# Patient Record
Sex: MALE | Race: WHITE | Employment: UNEMPLOYED | URBAN - METROPOLITAN AREA
[De-identification: names, ages, dates, MRNs, and addresses within clinical notes are randomized per-mention and may not be internally consistent; named-entity substitution may affect disease eponyms.]

---

## 2019-09-06 ENCOUNTER — HOSPITAL ENCOUNTER (EMERGENCY)
Age: 44
Discharge: HOME | End: 2019-09-07
Payer: MEDICAID

## 2019-09-06 DIAGNOSIS — S05.01XA ABRASION OF RIGHT CORNEA, INITIAL ENCOUNTER: Primary | ICD-10-CM

## 2019-09-06 DIAGNOSIS — T15.01XA FOREIGN BODY OF RIGHT CORNEA, INITIAL ENCOUNTER: ICD-10-CM

## 2019-09-06 PROCEDURE — 2580000003 HC RX 258: Performed by: NURSE PRACTITIONER

## 2019-09-06 PROCEDURE — 6360000002 HC RX W HCPCS: Performed by: NURSE PRACTITIONER

## 2019-09-06 PROCEDURE — 90471 IMMUNIZATION ADMIN: CPT | Performed by: NURSE PRACTITIONER

## 2019-09-06 PROCEDURE — 96365 THER/PROPH/DIAG IV INF INIT: CPT

## 2019-09-06 PROCEDURE — 90715 TDAP VACCINE 7 YRS/> IM: CPT | Performed by: NURSE PRACTITIONER

## 2019-09-06 PROCEDURE — 4500000023 HC ED LEVEL 3 PROCEDURE

## 2019-09-06 PROCEDURE — 99283 EMERGENCY DEPT VISIT LOW MDM: CPT

## 2019-09-06 RX ORDER — SODIUM CHLORIDE, SODIUM LACTATE, POTASSIUM CHLORIDE, AND CALCIUM CHLORIDE .6; .31; .03; .02 G/100ML; G/100ML; G/100ML; G/100ML
1000 INJECTION, SOLUTION INTRAVENOUS ONCE
Status: COMPLETED | OUTPATIENT
Start: 2019-09-06 | End: 2019-09-07

## 2019-09-06 RX ORDER — ERYTHROMYCIN 5 MG/G
OINTMENT OPHTHALMIC
Qty: 1 G | Refills: 0 | Status: SHIPPED | OUTPATIENT
Start: 2019-09-06

## 2019-09-06 RX ADMIN — TETANUS TOXOID, REDUCED DIPHTHERIA TOXOID AND ACELLULAR PERTUSSIS VACCINE, ADSORBED 0.5 ML: 5; 2.5; 8; 8; 2.5 SUSPENSION INTRAMUSCULAR at 23:42

## 2019-09-06 RX ADMIN — SODIUM CHLORIDE, POTASSIUM CHLORIDE, SODIUM LACTATE AND CALCIUM CHLORIDE 1000 ML: 600; 310; 30; 20 INJECTION, SOLUTION INTRAVENOUS at 23:44

## 2019-09-06 ASSESSMENT — VISUAL ACUITY
OU: 20/20
OS: 20/25
OD: 20/25

## 2019-09-06 ASSESSMENT — PAIN SCALES - GENERAL: PAINLEVEL_OUTOF10: 4

## 2019-09-07 VITALS
HEIGHT: 70 IN | SYSTOLIC BLOOD PRESSURE: 144 MMHG | TEMPERATURE: 98.3 F | DIASTOLIC BLOOD PRESSURE: 81 MMHG | OXYGEN SATURATION: 98 % | HEART RATE: 78 BPM | RESPIRATION RATE: 16 BRPM | BODY MASS INDEX: 23.62 KG/M2 | WEIGHT: 165 LBS

## 2019-09-07 PROCEDURE — 6370000000 HC RX 637 (ALT 250 FOR IP): Performed by: NURSE PRACTITIONER

## 2019-09-07 RX ORDER — OXYCODONE HYDROCHLORIDE AND ACETAMINOPHEN 5; 325 MG/1; MG/1
1 TABLET ORAL ONCE
Status: COMPLETED | OUTPATIENT
Start: 2019-09-07 | End: 2019-09-07

## 2019-09-07 RX ORDER — OXYCODONE HYDROCHLORIDE AND ACETAMINOPHEN 5; 325 MG/1; MG/1
1 TABLET ORAL EVERY 6 HOURS PRN
Qty: 6 TABLET | Refills: 0 | Status: SHIPPED | OUTPATIENT
Start: 2019-09-07 | End: 2019-09-10

## 2019-09-07 RX ORDER — ERYTHROMYCIN 5 MG/G
OINTMENT OPHTHALMIC ONCE
Status: COMPLETED | OUTPATIENT
Start: 2019-09-07 | End: 2019-09-07

## 2019-09-07 RX ADMIN — OXYCODONE HYDROCHLORIDE AND ACETAMINOPHEN 1 TABLET: 5; 325 TABLET ORAL at 01:12

## 2019-09-07 RX ADMIN — ERYTHROMYCIN: 5 OINTMENT OPHTHALMIC at 01:12

## 2019-09-07 ASSESSMENT — PAIN SCALES - GENERAL
PAINLEVEL_OUTOF10: 2
PAINLEVEL_OUTOF10: 3

## 2019-09-07 NOTE — ED PROVIDER NOTES
[address] [facility] Emergency Department    CHIEF COMPLAINT  Foreign Body (right eye pieced of metal since yesterday)      HISTORY OF PRESENT ILLNESS  Natalya Anderson is a 37 y.o. male who presents to the ED complaining of right eye pain. Patient reports working on a car this afternoon and \"rust\" got into his eyes. Patient reports he feels like something is still in his right eye. Patient unsure of last tetanus vaccination status. Patient rates his pain a 6/10. Patient denies blurred vision. Patient does not wear contacts. No other complaints, modifying factors or associated symptoms. Nursing notes reviewed. Past Medical History:   Diagnosis Date    Cervical disc disease     C6-7; work injury 2006;     Fibrosing mediastinitis 2005    Histoplasmosis     Injury of nerve of wrist or hand 2009    saw injury left hand     Past Surgical History:   Procedure Laterality Date    PLEURAL BIOPSY Right 2005    SHOULDER SURGERY Left     for dislocation     Family History   Adopted: Yes     Social History     Socioeconomic History    Marital status: Single     Spouse name: Not on file    Number of children: 2    Years of education: Not on file    Highest education level: Not on file   Occupational History    Occupation: unemployed    Social Needs    Financial resource strain: Not on file    Food insecurity:     Worry: Not on file     Inability: Not on file   [employer] needs:     Medical: Not on file     Non-medical: Not on file   Tobacco Use    Smoking status: Current Every Day Smoker    Smokeless tobacco: Never Used    Tobacco comment: encouraged to consider quitting.    Substance and Sexual Activity    Alcohol use: No    Drug use: No    Sexual activity: Yes     Partners: Female     Comment: lives with  olamide   Lifestyle    Physical activity:     Days per week: Not on file     Minutes per session: Not on file    Stress: Not on file   Relationships    Social connections:     Talks on phone: Not on file     Gets together: Not on file     Attends Synagogue service: Not on file     Active member of club or organization: Not on file     Attends meetings of clubs or organizations: Not on file     Relationship status: Not on file    Intimate partner violence:     Fear of current or ex partner: Not on file     Emotionally abused: Not on file     Physically abused: Not on file     Forced sexual activity: Not on file   Other Topics Concern    Not on file   Social History Narrative    Not on file     No current facility-administered medications for this encounter. Current Outpatient Medications   Medication Sig Dispense Refill    oxyCODONE-acetaminophen (PERCOCET) 5-325 MG per tablet Take 1 tablet by mouth every 6 hours as needed for Pain for up to 3 days. Intended supply: 3 days. Take lowest dose possible to manage pain 6 tablet 0    erythromycin (ROMYCIN) 5 MG/GM ophthalmic ointment Apply 0.5 inch ribbon to right eye 4 times a day while awake 1 g 0    albuterol (PROVENTIL HFA;VENTOLIN HFA) 108 (90 BASE) MCG/ACT inhaler Inhale 2 puffs into the lungs every 6 hours as needed.  sertraline (ZOLOFT) 50 MG tablet Take 1 tablet by mouth daily. 30 tablet 3    gabapentin (NEURONTIN) 600 MG tablet Take 1 tablet by mouth 4 times daily. 120 tablet 0     Allergies   Allergen Reactions    Hydrocodone        REVIEW OF SYSTEMS  6 systems reviewed, pertinent positives per HPI otherwise noted to be negative    PHYSICAL EXAM  BP (!) 152/98 Comment: pt does not take b/p meds daily  Pulse 78   Temp 98.7 °F (37.1 °C) (Oral)   Resp 16   Ht 5' 10\" (1.778 m)   Wt 165 lb (74.8 kg)   SpO2 99%   BMI 23.68 kg/m²   GENERAL APPEARANCE: Awake and alert. Cooperative. No acute distress. HEAD: Normocephalic. Atraumatic. EYES: PERRL. EOM's grossly intact. Right sclera is erythematous with clear drainage. There is a small foreign body just below the pupil.  Corneal abrasion seen with fluorescein uptake to right cornea at 5 o clock below the iris and directly next to the FB over the pupil vertically. No hyphema. No Jenifer sign. Visual acuity bilaterally is 20/20, left is 20/25, right is 20/25. ENT: Mucous membranes are moist.   NECK: Supple. Normal ROM. CHEST: Equal symmetric chest rise. LUNGS: Breathing is unlabored. Speaking comfortably in full sentences. Abdomen: Nondistended  EXTREMITIES: MAEE. No acute deformities. SKIN: Warm and dry. NEUROLOGICAL: Alert and oriented. Strength is 5/5 in all extremities and sensation is intact. ED COURSE/MDM  Patient seen and evaluated. Old records reviewed. Diagnostic testing reviewed and results discussed. I have independently evaluated this patient based upon my scope of practice. Supervising physician was in the department as needed for consultation. Amando Nova presented to the ED today with above noted complaints. Physical exam reveals foreign body on the cornea. Foreign body seen on Q-tip. Right eye flush with a Husam's lens patient tolerated well. Patient had good relief of pain with tetracaine drops. Rust ring remains where foreign body was. Patient received tetracaine drops and Percocet for pain, with good relief. While in ED patient received   Medications   Tetanus-Diphth-Acell Pertussis (BOOSTRIX) injection 0.5 mL (0.5 mLs Intramuscular Given 9/6/19 2342)   lactated ringers bolus (0 mLs Intravenous Stopped 9/7/19 0006)   erythromycin [facility]) ophthalmic ointment ( Right Eye Given 9/7/19 0112)   oxyCODONE-acetaminophen (PERCOCET) 5-325 MG per tablet 1 tablet (1 tablet Oral Given 9/7/19 0112)       At this point I do not feel the patient requires further work up and it is reasonable to discharge the patient. A discussion was had with the patient and/or their surrogate regarding diagnosis, diagnostic testing results, treatment/ plan of care, and follow up.  There was shared decision-making between (ROMYCIN) 5 MG/GM OPHTHALMIC OINTMENT    Apply 0.5 inch ribbon to right eye 4 times a day while awake    OXYCODONE-ACETAMINOPHEN (PERCOCET) 5-325 MG PER TABLET    Take 1 tablet by mouth every 6 hours as needed for Pain for up to 3 days. Intended supply: 3 days. Take lowest dose possible to manage pain       FOLLOW UP  [address]  [phone]    Schedule an appointment as soon as possible for a visit   follow up    [facility]  ED  [address]  Go to   As needed, If symptoms worsen      DISPOSITION  Patient was discharged to home in good condition. Comment: Please note this report has been produced using speech recognition software and may contain errors related to that system including errors in grammar, punctuation, and spelling, as well as words and phrases that may be inappropriate. If there are any questions or concerns please feel free to contact the dictating provider for clarification.         JOHNSON Hanley - CNP  09/07/19 0043

## 2023-11-14 ENCOUNTER — APPOINTMENT (OUTPATIENT)
Dept: CARDIAC CATH/INVASIVE PROCEDURES | Age: 48
DRG: 173 | End: 2023-11-14
Payer: COMMERCIAL

## 2023-11-14 ENCOUNTER — APPOINTMENT (OUTPATIENT)
Dept: GENERAL RADIOLOGY | Age: 48
DRG: 173 | End: 2023-11-14
Payer: COMMERCIAL

## 2023-11-14 ENCOUNTER — APPOINTMENT (OUTPATIENT)
Dept: CT IMAGING | Age: 48
DRG: 173 | End: 2023-11-14
Payer: COMMERCIAL

## 2023-11-14 ENCOUNTER — HOSPITAL ENCOUNTER (INPATIENT)
Age: 48
LOS: 2 days | Discharge: HOME | DRG: 173 | End: 2023-11-16
Attending: EMERGENCY MEDICINE | Admitting: SURGERY
Payer: COMMERCIAL

## 2023-11-14 DIAGNOSIS — J96.01 ACUTE RESPIRATORY FAILURE WITH HYPOXIA (HCC): ICD-10-CM

## 2023-11-14 DIAGNOSIS — F19.90 SUBSTANCE USE DISORDER: ICD-10-CM

## 2023-11-14 DIAGNOSIS — Z72.0 TOBACCO ABUSE: ICD-10-CM

## 2023-11-14 DIAGNOSIS — R79.89 ELEVATED TROPONIN: ICD-10-CM

## 2023-11-14 DIAGNOSIS — R79.89 ELEVATED LACTIC ACID LEVEL: ICD-10-CM

## 2023-11-14 DIAGNOSIS — I26.09 ACUTE PULMONARY EMBOLISM WITH ACUTE COR PULMONALE, UNSPECIFIED PULMONARY EMBOLISM TYPE (HCC): Primary | ICD-10-CM

## 2023-11-14 PROBLEM — I26.99 PULMONARY EMBOLISM AND INFARCTION (HCC): Status: ACTIVE | Noted: 2023-11-14

## 2023-11-14 PROBLEM — I74.9 EMBOLISM (HCC): Status: ACTIVE | Noted: 2023-11-14

## 2023-11-14 LAB
ALBUMIN SERPL-MCNC: 3.9 G/DL (ref 3.4–5)
ALBUMIN/GLOB SERPL: 1.1 {RATIO} (ref 1.1–2.2)
ALP SERPL-CCNC: 100 U/L (ref 40–129)
ALT SERPL-CCNC: 35 U/L (ref 10–40)
AMPHETAMINES UR QL SCN>1000 NG/ML: ABNORMAL
ANION GAP SERPL CALCULATED.3IONS-SCNC: 11 MMOL/L (ref 3–16)
ANION GAP SERPL CALCULATED.3IONS-SCNC: 14 MMOL/L (ref 3–16)
ANISOCYTOSIS BLD QL SMEAR: ABNORMAL
APTT BLD: 25.2 SEC (ref 22.7–35.9)
AST SERPL-CCNC: 31 U/L (ref 15–37)
BARBITURATES UR QL SCN>200 NG/ML: ABNORMAL
BASE EXCESS BLDV CALC-SCNC: -2.9 MMOL/L (ref -3–3)
BASOPHILS # BLD: 0.2 K/UL (ref 0–0.2)
BASOPHILS NFR BLD: 1 %
BENZODIAZ UR QL SCN>200 NG/ML: ABNORMAL
BILIRUB SERPL-MCNC: 0.3 MG/DL (ref 0–1)
BILIRUB UR QL STRIP.AUTO: NEGATIVE
BUN SERPL-MCNC: 10 MG/DL (ref 7–20)
BUN SERPL-MCNC: 9 MG/DL (ref 7–20)
CALCIUM SERPL-MCNC: 8.8 MG/DL (ref 8.3–10.6)
CALCIUM SERPL-MCNC: 9.1 MG/DL (ref 8.3–10.6)
CANNABINOIDS UR QL SCN>50 NG/ML: ABNORMAL
CHLORIDE SERPL-SCNC: 97 MMOL/L (ref 99–110)
CHLORIDE SERPL-SCNC: 99 MMOL/L (ref 99–110)
CLARITY UR: CLEAR
CO2 BLDV-SCNC: 28 MMOL/L
CO2 SERPL-SCNC: 26 MMOL/L (ref 21–32)
CO2 SERPL-SCNC: 27 MMOL/L (ref 21–32)
COCAINE UR QL SCN: POSITIVE
COHGB MFR BLDV: 3.3 % (ref 0–1.5)
COLOR UR: ABNORMAL
CREAT SERPL-MCNC: 1.3 MG/DL (ref 0.9–1.3)
CREAT SERPL-MCNC: 1.5 MG/DL (ref 0.9–1.3)
DEPRECATED RDW RBC AUTO: 14.2 % (ref 12.4–15.4)
DRUG SCREEN COMMENT UR-IMP: ABNORMAL
EKG ATRIAL RATE: 85 BPM
EKG DIAGNOSIS: NORMAL
EKG Q-T INTERVAL: 424 MS
EKG QRS DURATION: 82 MS
EKG QTC CALCULATION (BAZETT): 607 MS
EKG R AXIS: 83 DEGREES
EKG T AXIS: 23 DEGREES
EKG VENTRICULAR RATE: 123 BPM
EOSINOPHIL # BLD: 1.1 K/UL (ref 0–0.6)
EOSINOPHIL NFR BLD: 6 %
FENTANYL SCREEN, URINE: POSITIVE
FIBRINOGEN PPP-MCNC: 404 MG/DL (ref 243–550)
FLUAV RNA RESP QL NAA+PROBE: NOT DETECTED
FLUBV RNA RESP QL NAA+PROBE: NOT DETECTED
GFR SERPLBLD CREATININE-BSD FMLA CKD-EPI: 56 ML/MIN/{1.73_M2}
GFR SERPLBLD CREATININE-BSD FMLA CKD-EPI: >60 ML/MIN/{1.73_M2}
GLUCOSE SERPL-MCNC: 159 MG/DL (ref 70–99)
GLUCOSE SERPL-MCNC: 160 MG/DL (ref 70–99)
GLUCOSE UR STRIP.AUTO-MCNC: NEGATIVE MG/DL
HCO3 BLDV-SCNC: 26 MMOL/L (ref 23–29)
HCT VFR BLD AUTO: 42.4 % (ref 40.5–52.5)
HGB BLD-MCNC: 13.9 G/DL (ref 13.5–17.5)
HGB UR QL STRIP.AUTO: NEGATIVE
INR PPP: 1.03 (ref 0.84–1.16)
KETONES UR STRIP.AUTO-MCNC: NEGATIVE MG/DL
LACTATE BLDV-SCNC: 3.6 MMOL/L (ref 0.4–1.9)
LACTATE BLDV-SCNC: 5.1 MMOL/L (ref 0.4–1.9)
LACTATE BLDV-SCNC: 5.2 MMOL/L (ref 0.4–2)
LEUKOCYTE ESTERASE UR QL STRIP.AUTO: NEGATIVE
LYMPHOCYTES # BLD: 7.6 K/UL (ref 1–5.1)
LYMPHOCYTES NFR BLD: 18 %
MACROCYTES BLD QL SMEAR: ABNORMAL
MCH RBC QN AUTO: 29 PG (ref 26–34)
MCHC RBC AUTO-ENTMCNC: 32.7 G/DL (ref 31–36)
MCV RBC AUTO: 88.6 FL (ref 80–100)
METHADONE UR QL SCN>300 NG/ML: ABNORMAL
METHGB MFR BLDV: 0.4 %
MONOCYTES # BLD: 1.1 K/UL (ref 0–1.3)
MONOCYTES NFR BLD: 6 %
NEUTROPHILS # BLD: 8.1 K/UL (ref 1.7–7.7)
NEUTROPHILS NFR BLD: 36 %
NEUTS BAND NFR BLD MANUAL: 9 % (ref 0–7)
NITRITE UR QL STRIP.AUTO: NEGATIVE
NT-PROBNP SERPL-MCNC: <36 PG/ML (ref 0–124)
O2 THERAPY: ABNORMAL
OPIATES UR QL SCN>300 NG/ML: POSITIVE
OVALOCYTES BLD QL SMEAR: ABNORMAL
OXYCODONE UR QL SCN: ABNORMAL
PATH INTERP BLD-IMP: YES
PCO2 BLDV: 62.6 MMHG (ref 40–50)
PCP UR QL SCN>25 NG/ML: ABNORMAL
PH BLDV: 7.24 [PH] (ref 7.35–7.45)
PH UR STRIP.AUTO: 6 [PH] (ref 5–8)
PH UR STRIP: 6 [PH]
PLATELET # BLD AUTO: 303 K/UL (ref 135–450)
PMV BLD AUTO: 7.4 FL (ref 5–10.5)
PO2 BLDV: 27.3 MMHG (ref 25–40)
POIKILOCYTOSIS BLD QL SMEAR: ABNORMAL
POTASSIUM SERPL-SCNC: 3.6 MMOL/L (ref 3.5–5.1)
POTASSIUM SERPL-SCNC: 3.8 MMOL/L (ref 3.5–5.1)
PROT SERPL-MCNC: 7.6 G/DL (ref 6.4–8.2)
PROT UR STRIP.AUTO-MCNC: NEGATIVE MG/DL
PROTHROMBIN TIME: 13.5 SEC (ref 11.5–14.8)
RBC # BLD AUTO: 4.79 M/UL (ref 4.2–5.9)
SAO2 % BLDV: 49 %
SARS-COV-2 RNA RESP QL NAA+PROBE: NOT DETECTED
SMUDGE CELLS BLD QL SMEAR: PRESENT
SODIUM SERPL-SCNC: 137 MMOL/L (ref 136–145)
SODIUM SERPL-SCNC: 137 MMOL/L (ref 136–145)
SP GR UR STRIP.AUTO: 1.01 (ref 1–1.03)
SPECIMEN STATUS: NORMAL
TROPONIN, HIGH SENSITIVITY: 366 NG/L (ref 0–22)
TROPONIN, HIGH SENSITIVITY: 406 NG/L (ref 0–22)
TROPONIN, HIGH SENSITIVITY: 459 NG/L (ref 0–22)
TROPONIN, HIGH SENSITIVITY: 55 NG/L (ref 0–22)
UA COMPLETE W REFLEX CULTURE PNL UR: ABNORMAL
UA DIPSTICK W REFLEX MICRO PNL UR: ABNORMAL
URN SPEC COLLECT METH UR: ABNORMAL
UROBILINOGEN UR STRIP-ACNC: 0.2 E.U./DL
VARIANT LYMPHS NFR BLD MANUAL: 24 % (ref 0–6)
WBC # BLD AUTO: 18 K/UL (ref 4–11)

## 2023-11-14 PROCEDURE — 99285 EMERGENCY DEPT VISIT HI MDM: CPT

## 2023-11-14 PROCEDURE — 96361 HYDRATE IV INFUSION ADD-ON: CPT

## 2023-11-14 PROCEDURE — 71260 CT THORAX DX C+: CPT

## 2023-11-14 PROCEDURE — 94761 N-INVAS EAR/PLS OXIMETRY MLT: CPT

## 2023-11-14 PROCEDURE — 36415 COLL VENOUS BLD VENIPUNCTURE: CPT

## 2023-11-14 PROCEDURE — 2580000003 HC RX 258: Performed by: PHYSICIAN ASSISTANT

## 2023-11-14 PROCEDURE — 6360000002 HC RX W HCPCS

## 2023-11-14 PROCEDURE — 85730 THROMBOPLASTIN TIME PARTIAL: CPT

## 2023-11-14 PROCEDURE — 2000000000 HC ICU R&B

## 2023-11-14 PROCEDURE — 85025 COMPLETE CBC W/AUTO DIFF WBC: CPT

## 2023-11-14 PROCEDURE — 96375 TX/PRO/DX INJ NEW DRUG ADDON: CPT

## 2023-11-14 PROCEDURE — 6360000004 HC RX CONTRAST MEDICATION

## 2023-11-14 PROCEDURE — 93010 ELECTROCARDIOGRAM REPORT: CPT | Performed by: INTERNAL MEDICINE

## 2023-11-14 PROCEDURE — C1894 INTRO/SHEATH, NON-LASER: HCPCS | Performed by: SURGERY

## 2023-11-14 PROCEDURE — 87040 BLOOD CULTURE FOR BACTERIA: CPT

## 2023-11-14 PROCEDURE — 96366 THER/PROPH/DIAG IV INF ADDON: CPT

## 2023-11-14 PROCEDURE — 80053 COMPREHEN METABOLIC PANEL: CPT

## 2023-11-14 PROCEDURE — 96368 THER/DIAG CONCURRENT INF: CPT

## 2023-11-14 PROCEDURE — 81003 URINALYSIS AUTO W/O SCOPE: CPT

## 2023-11-14 PROCEDURE — 6360000002 HC RX W HCPCS: Performed by: SURGERY

## 2023-11-14 PROCEDURE — 37211 THROMBOLYTIC ART THERAPY: CPT

## 2023-11-14 PROCEDURE — 2580000003 HC RX 258: Performed by: FAMILY MEDICINE

## 2023-11-14 PROCEDURE — 6360000004 HC RX CONTRAST MEDICATION: Performed by: PHYSICIAN ASSISTANT

## 2023-11-14 PROCEDURE — 85384 FIBRINOGEN ACTIVITY: CPT

## 2023-11-14 PROCEDURE — 2580000003 HC RX 258: Performed by: SURGERY

## 2023-11-14 PROCEDURE — 6370000000 HC RX 637 (ALT 250 FOR IP): Performed by: INTERNAL MEDICINE

## 2023-11-14 PROCEDURE — 96365 THER/PROPH/DIAG IV INF INIT: CPT

## 2023-11-14 PROCEDURE — 6360000002 HC RX W HCPCS: Performed by: PHYSICIAN ASSISTANT

## 2023-11-14 PROCEDURE — 3E06317 INTRODUCTION OF OTHER THROMBOLYTIC INTO CENTRAL ARTERY, PERCUTANEOUS APPROACH: ICD-10-PCS | Performed by: SURGERY

## 2023-11-14 PROCEDURE — 71045 X-RAY EXAM CHEST 1 VIEW: CPT

## 2023-11-14 PROCEDURE — 2580000003 HC RX 258: Performed by: INTERNAL MEDICINE

## 2023-11-14 PROCEDURE — 02FQ3Z0 FRAGMENTATION OF RIGHT PULMONARY ARTERY, PERCUTANEOUS APPROACH, ULTRASONIC: ICD-10-PCS | Performed by: SURGERY

## 2023-11-14 PROCEDURE — C1751 CATH, INF, PER/CENT/MIDLINE: HCPCS | Performed by: SURGERY

## 2023-11-14 PROCEDURE — 2709999900 HC NON-CHARGEABLE SUPPLY: Performed by: SURGERY

## 2023-11-14 PROCEDURE — 93005 ELECTROCARDIOGRAM TRACING: CPT | Performed by: PHYSICIAN ASSISTANT

## 2023-11-14 PROCEDURE — 02FR3Z0 FRAGMENTATION OF LEFT PULMONARY ARTERY, PERCUTANEOUS APPROACH, ULTRASONIC: ICD-10-PCS | Performed by: SURGERY

## 2023-11-14 PROCEDURE — 87636 SARSCOV2 & INF A&B AMP PRB: CPT

## 2023-11-14 PROCEDURE — 36014 PLACE CATHETER IN ARTERY: CPT

## 2023-11-14 PROCEDURE — 2700000000 HC OXYGEN THERAPY PER DAY

## 2023-11-14 PROCEDURE — 93356 MYOCRD STRAIN IMG SPCKL TRCK: CPT

## 2023-11-14 PROCEDURE — 85610 PROTHROMBIN TIME: CPT

## 2023-11-14 PROCEDURE — 83880 ASSAY OF NATRIURETIC PEPTIDE: CPT

## 2023-11-14 PROCEDURE — 2580000003 HC RX 258: Performed by: EMERGENCY MEDICINE

## 2023-11-14 PROCEDURE — C1769 GUIDE WIRE: HCPCS | Performed by: SURGERY

## 2023-11-14 PROCEDURE — 84484 ASSAY OF TROPONIN QUANT: CPT

## 2023-11-14 PROCEDURE — 94644 CONT INHLJ TX 1ST HOUR: CPT

## 2023-11-14 PROCEDURE — 6370000000 HC RX 637 (ALT 250 FOR IP): Performed by: PHYSICIAN ASSISTANT

## 2023-11-14 PROCEDURE — 2500000003 HC RX 250 WO HCPCS

## 2023-11-14 PROCEDURE — C8929 TTE W OR WO FOL WCON,DOPPLER: HCPCS

## 2023-11-14 PROCEDURE — 80307 DRUG TEST PRSMV CHEM ANLYZR: CPT

## 2023-11-14 PROCEDURE — 83605 ASSAY OF LACTIC ACID: CPT

## 2023-11-14 PROCEDURE — 82803 BLOOD GASES ANY COMBINATION: CPT

## 2023-11-14 RX ORDER — IPRATROPIUM BROMIDE AND ALBUTEROL SULFATE 2.5; .5 MG/3ML; MG/3ML
1 SOLUTION RESPIRATORY (INHALATION)
Status: COMPLETED | OUTPATIENT
Start: 2023-11-14 | End: 2023-11-14

## 2023-11-14 RX ORDER — HEPARIN SODIUM 10000 [USP'U]/100ML
1450 INJECTION, SOLUTION INTRAVENOUS CONTINUOUS
Status: DISCONTINUED | OUTPATIENT
Start: 2023-11-14 | End: 2023-11-14

## 2023-11-14 RX ORDER — SODIUM CHLORIDE 9 MG/ML
INJECTION, SOLUTION INTRAVENOUS CONTINUOUS PRN
Status: DISCONTINUED | OUTPATIENT
Start: 2023-11-14 | End: 2023-11-15

## 2023-11-14 RX ORDER — CARBOXYMETHYLCELLULOSE SODIUM 10 MG/ML
GEL OPHTHALMIC
Status: DISPENSED
Start: 2023-11-14 | End: 2023-11-15

## 2023-11-14 RX ORDER — ONDANSETRON 4 MG/1
4 TABLET, ORALLY DISINTEGRATING ORAL EVERY 8 HOURS PRN
Status: DISCONTINUED | OUTPATIENT
Start: 2023-11-14 | End: 2023-11-16 | Stop reason: HOSPADM

## 2023-11-14 RX ORDER — ASPIRIN 81 MG/1
81 TABLET, CHEWABLE ORAL DAILY
Status: DISCONTINUED | OUTPATIENT
Start: 2023-11-15 | End: 2023-11-16 | Stop reason: HOSPADM

## 2023-11-14 RX ORDER — HEPARIN SODIUM 1000 [USP'U]/ML
3200 INJECTION, SOLUTION INTRAVENOUS; SUBCUTANEOUS PRN
Status: DISCONTINUED | OUTPATIENT
Start: 2023-11-14 | End: 2023-11-15

## 2023-11-14 RX ORDER — HEPARIN SODIUM 1000 [USP'U]/ML
6400 INJECTION, SOLUTION INTRAVENOUS; SUBCUTANEOUS ONCE
Status: COMPLETED | OUTPATIENT
Start: 2023-11-14 | End: 2023-11-14

## 2023-11-14 RX ORDER — POLYETHYLENE GLYCOL 3350 17 G/17G
17 POWDER, FOR SOLUTION ORAL DAILY PRN
Status: DISCONTINUED | OUTPATIENT
Start: 2023-11-14 | End: 2023-11-16 | Stop reason: HOSPADM

## 2023-11-14 RX ORDER — HEPARIN SODIUM 10000 [USP'U]/100ML
250 INJECTION, SOLUTION INTRAVENOUS CONTINUOUS
Status: DISCONTINUED | OUTPATIENT
Start: 2023-11-14 | End: 2023-11-15

## 2023-11-14 RX ORDER — ACETAMINOPHEN 325 MG/1
650 TABLET ORAL EVERY 6 HOURS PRN
Status: DISCONTINUED | OUTPATIENT
Start: 2023-11-14 | End: 2023-11-16 | Stop reason: HOSPADM

## 2023-11-14 RX ORDER — 0.9 % SODIUM CHLORIDE 0.9 %
1000 INTRAVENOUS SOLUTION INTRAVENOUS ONCE
Status: COMPLETED | OUTPATIENT
Start: 2023-11-14 | End: 2023-11-14

## 2023-11-14 RX ORDER — ACETAMINOPHEN 650 MG/1
650 SUPPOSITORY RECTAL EVERY 6 HOURS PRN
Status: DISCONTINUED | OUTPATIENT
Start: 2023-11-14 | End: 2023-11-16 | Stop reason: HOSPADM

## 2023-11-14 RX ORDER — SODIUM CHLORIDE 0.9 % (FLUSH) 0.9 %
5-40 SYRINGE (ML) INJECTION PRN
Status: DISCONTINUED | OUTPATIENT
Start: 2023-11-14 | End: 2023-11-16 | Stop reason: HOSPADM

## 2023-11-14 RX ORDER — SODIUM CHLORIDE 0.9 % (FLUSH) 0.9 %
5-40 SYRINGE (ML) INJECTION PRN
Status: DISCONTINUED | OUTPATIENT
Start: 2023-11-14 | End: 2023-11-15

## 2023-11-14 RX ORDER — SODIUM CHLORIDE 9 MG/ML
INJECTION, SOLUTION INTRAVENOUS CONTINUOUS
Status: DISCONTINUED | OUTPATIENT
Start: 2023-11-14 | End: 2023-11-16 | Stop reason: HOSPADM

## 2023-11-14 RX ORDER — MAGNESIUM SULFATE IN WATER 40 MG/ML
2000 INJECTION, SOLUTION INTRAVENOUS PRN
Status: DISCONTINUED | OUTPATIENT
Start: 2023-11-14 | End: 2023-11-16 | Stop reason: HOSPADM

## 2023-11-14 RX ORDER — ALBUTEROL SULFATE 90 UG/1
2 AEROSOL, METERED RESPIRATORY (INHALATION) EVERY 6 HOURS PRN
Status: DISCONTINUED | OUTPATIENT
Start: 2023-11-14 | End: 2023-11-16 | Stop reason: HOSPADM

## 2023-11-14 RX ORDER — ONDANSETRON 4 MG/1
4 TABLET, ORALLY DISINTEGRATING ORAL EVERY 8 HOURS PRN
Status: DISCONTINUED | OUTPATIENT
Start: 2023-11-14 | End: 2023-11-15 | Stop reason: SDUPTHER

## 2023-11-14 RX ORDER — POTASSIUM CHLORIDE 20 MEQ/1
40 TABLET, EXTENDED RELEASE ORAL PRN
Status: DISCONTINUED | OUTPATIENT
Start: 2023-11-14 | End: 2023-11-16 | Stop reason: HOSPADM

## 2023-11-14 RX ORDER — POTASSIUM CHLORIDE 7.45 MG/ML
10 INJECTION INTRAVENOUS PRN
Status: DISCONTINUED | OUTPATIENT
Start: 2023-11-14 | End: 2023-11-16 | Stop reason: HOSPADM

## 2023-11-14 RX ORDER — SODIUM CHLORIDE 9 MG/ML
INJECTION, SOLUTION INTRAVENOUS CONTINUOUS
Status: DISCONTINUED | OUTPATIENT
Start: 2023-11-14 | End: 2023-11-15

## 2023-11-14 RX ORDER — SODIUM CHLORIDE 9 MG/ML
30 INJECTION, SOLUTION INTRAVENOUS ONCE
Status: COMPLETED | OUTPATIENT
Start: 2023-11-14 | End: 2023-11-14

## 2023-11-14 RX ORDER — ONDANSETRON 2 MG/ML
4 INJECTION INTRAMUSCULAR; INTRAVENOUS EVERY 6 HOURS PRN
Status: DISCONTINUED | OUTPATIENT
Start: 2023-11-14 | End: 2023-11-15 | Stop reason: SDUPTHER

## 2023-11-14 RX ORDER — ONDANSETRON 2 MG/ML
4 INJECTION INTRAMUSCULAR; INTRAVENOUS EVERY 6 HOURS PRN
Status: DISCONTINUED | OUTPATIENT
Start: 2023-11-14 | End: 2023-11-16 | Stop reason: HOSPADM

## 2023-11-14 RX ORDER — SODIUM CHLORIDE 0.9 % (FLUSH) 0.9 %
5-40 SYRINGE (ML) INJECTION EVERY 12 HOURS SCHEDULED
Status: DISCONTINUED | OUTPATIENT
Start: 2023-11-14 | End: 2023-11-15

## 2023-11-14 RX ORDER — NITROGLYCERIN 0.4 MG/1
0.4 TABLET SUBLINGUAL EVERY 5 MIN PRN
Status: DISCONTINUED | OUTPATIENT
Start: 2023-11-14 | End: 2023-11-16 | Stop reason: HOSPADM

## 2023-11-14 RX ORDER — 0.9 % SODIUM CHLORIDE 0.9 %
1000 INTRAVENOUS SOLUTION INTRAVENOUS ONCE
Status: DISCONTINUED | OUTPATIENT
Start: 2023-11-14 | End: 2023-11-14

## 2023-11-14 RX ORDER — HEPARIN SODIUM 1000 [USP'U]/ML
6400 INJECTION, SOLUTION INTRAVENOUS; SUBCUTANEOUS PRN
Status: DISCONTINUED | OUTPATIENT
Start: 2023-11-14 | End: 2023-11-15

## 2023-11-14 RX ORDER — LABETALOL HYDROCHLORIDE 5 MG/ML
10 INJECTION, SOLUTION INTRAVENOUS EVERY 4 HOURS PRN
Status: DISCONTINUED | OUTPATIENT
Start: 2023-11-14 | End: 2023-11-16 | Stop reason: HOSPADM

## 2023-11-14 RX ORDER — SODIUM CHLORIDE 0.9 % (FLUSH) 0.9 %
5-40 SYRINGE (ML) INJECTION EVERY 12 HOURS SCHEDULED
Status: DISCONTINUED | OUTPATIENT
Start: 2023-11-14 | End: 2023-11-16 | Stop reason: HOSPADM

## 2023-11-14 RX ORDER — METHYLPREDNISOLONE SODIUM SUCCINATE 125 MG/2ML
125 INJECTION, POWDER, LYOPHILIZED, FOR SOLUTION INTRAMUSCULAR; INTRAVENOUS ONCE
Status: COMPLETED | OUTPATIENT
Start: 2023-11-14 | End: 2023-11-14

## 2023-11-14 RX ORDER — SODIUM CHLORIDE 9 MG/ML
25 INJECTION, SOLUTION INTRAVENOUS PRN
Status: DISCONTINUED | OUTPATIENT
Start: 2023-11-14 | End: 2023-11-16 | Stop reason: HOSPADM

## 2023-11-14 RX ORDER — SODIUM CHLORIDE 9 MG/ML
INJECTION, SOLUTION INTRAVENOUS PRN
Status: DISCONTINUED | OUTPATIENT
Start: 2023-11-14 | End: 2023-11-16 | Stop reason: HOSPADM

## 2023-11-14 RX ADMIN — ALTEPLASE 1 MG/HR: 2.2 INJECTION, POWDER, LYOPHILIZED, FOR SOLUTION INTRAVENOUS at 16:57

## 2023-11-14 RX ADMIN — IPRATROPIUM BROMIDE AND ALBUTEROL SULFATE 1 DOSE: 2.5; .5 SOLUTION RESPIRATORY (INHALATION) at 10:56

## 2023-11-14 RX ADMIN — HEPARIN SODIUM 250 UNITS/HR: 10000 INJECTION, SOLUTION INTRAVENOUS at 16:38

## 2023-11-14 RX ADMIN — SODIUM CHLORIDE 1000 ML: 9 INJECTION, SOLUTION INTRAVENOUS at 15:13

## 2023-11-14 RX ADMIN — Medication 10 ML: at 20:47

## 2023-11-14 RX ADMIN — ALBUTEROL SULFATE 15 MG/HR: 2.5 SOLUTION RESPIRATORY (INHALATION) at 10:56

## 2023-11-14 RX ADMIN — CEFEPIME 2000 MG: 2 INJECTION, POWDER, FOR SOLUTION INTRAVENOUS at 12:20

## 2023-11-14 RX ADMIN — HEPARIN SODIUM 1450 UNITS/HR: 10000 INJECTION, SOLUTION INTRAVENOUS at 13:01

## 2023-11-14 RX ADMIN — SODIUM CHLORIDE: 9 INJECTION, SOLUTION INTRAVENOUS at 16:32

## 2023-11-14 RX ADMIN — HEPARIN SODIUM 250 UNITS/HR: 10000 INJECTION, SOLUTION INTRAVENOUS at 16:37

## 2023-11-14 RX ADMIN — SODIUM CHLORIDE 1000 ML: 9 INJECTION, SOLUTION INTRAVENOUS at 20:51

## 2023-11-14 RX ADMIN — METHYLPREDNISOLONE SODIUM SUCCINATE 125 MG: 125 INJECTION INTRAMUSCULAR; INTRAVENOUS at 10:51

## 2023-11-14 RX ADMIN — SODIUM CHLORIDE: 9 INJECTION, SOLUTION INTRAVENOUS at 16:33

## 2023-11-14 RX ADMIN — SODIUM CHLORIDE 75 ML/HR: 9 INJECTION, SOLUTION INTRAVENOUS at 18:37

## 2023-11-14 RX ADMIN — SODIUM CHLORIDE 30 ML/KG/HR: 9 INJECTION, SOLUTION INTRAVENOUS at 11:35

## 2023-11-14 RX ADMIN — HEPARIN SODIUM 6400 UNITS: 1000 INJECTION INTRAVENOUS; SUBCUTANEOUS at 13:01

## 2023-11-14 RX ADMIN — VANCOMYCIN HYDROCHLORIDE 2000 MG: 10 INJECTION, POWDER, LYOPHILIZED, FOR SOLUTION INTRAVENOUS at 12:56

## 2023-11-14 RX ADMIN — IOPAMIDOL 75 ML: 755 INJECTION, SOLUTION INTRAVENOUS at 12:07

## 2023-11-14 ASSESSMENT — ENCOUNTER SYMPTOMS
EYE DISCHARGE: 0
NAUSEA: 0
SINUS PRESSURE: 0
ABDOMINAL PAIN: 0
DIARRHEA: 0
SINUS PAIN: 0
VOMITING: 0
COUGH: 0
CHEST TIGHTNESS: 0
SORE THROAT: 0
RHINORRHEA: 0
CONSTIPATION: 0
EYE REDNESS: 0
SHORTNESS OF BREATH: 1

## 2023-11-14 ASSESSMENT — PAIN - FUNCTIONAL ASSESSMENT: PAIN_FUNCTIONAL_ASSESSMENT: NONE - DENIES PAIN

## 2023-11-14 NOTE — ED PROVIDER NOTES
I independently evaluated and obtained a history and physical on Lenny Clemons. I personally saw the patient and performed a substantive portion of the visit including all aspects of the medical decision making. All diagnostic, treatment, and disposition assistants were made to myself in conjunction the advanced practice provider. For further details of this patient's emergency department encounter, please see the advanced practice provider's documentation. History: Patient is a 42-year-old male with a history of asthma, cystic fibrosis, and substance abuse who presents due to severe shortness of breath worse on exertion. Physician Exam: Adult male in mild respiratory distress. Intact distal pulses. Awake and alert. Oriented to person place time and situation. Moderate tachycardia and tachypnea. MDM:    I personally saw the patient and performed a substantive portion of the visit including all aspects of the medical decision making. The Ekg interpreted by me shows  Accelerated junctional rhythm, rate of 123  QTc is   607ms  Intervals and Durations are unremarkable. ST Segments: Nonspecific changes diffusely worse in inferior leads  No previous EKGs available for comparison      Patient is cardiac and tachypneic, CT PE studies performed emergency department read by radiology as well as independently reviewed by myself and does show acute pulmonary embolism with right heart strain. Troponin is significantly elevated. Interventional cardiology and vascular surgery both consulted and interventional cardiology comes the patient's room to evaluate him in person. Patient started on heparin and IV antibiotics admitted for further care. Consideration to placing a central in the emergency department discussed however after 30 mils per kilogram patient's temporary hypotension has completely resolved and he has normal blood pressure, normal mental status, and tachycardia has resolved.   Patient

## 2023-11-14 NOTE — ED PROVIDER NOTES
[address] [place] [address]  ED  EMERGENCY DEPARTMENT ENCOUNTER        Pt Name: Melchor Homans  MRN: [id number]  [address] [facility] [place] 1975  Date of evaluation: 11/14/2023  Provider: Maico Brown PA-C  PCP: Vicente Elizondo MD  Note Started: 11:19 AM EST 11/14/23       I have seen and evaluated this patient with my supervising physician Dr Ketih Nagel       Chief Complaint   Patient presents with    Shortness of Breath     Came by squad from home. Pt walked up 8-9 steps and became winded. Hx Asthma, cystic fibrosis, and pigeon's disease. Albuterol/neb given in route. Pt was 89% when squad arrived. Able to get to 96% with treatments. Pt desat to 78% in triage. HISTORY OF PRESENT ILLNESS: 1 or more Elements     History from : Patient and EMS    Limitations to history : None    Melchor Homans is a 50 y.o. male with a pMH of tobacco abuse, histoplasmosis, asthma, cervical spine disease, fibrosing mediastinitis who presents to the ED via EMS, where pt was provided with albuterol breathing tx and NCO2, called for sudden onset of SOB, CHAPPELL, pts oxygen was in the 70's on room air on their arrival, placed on 6LNC on arrival, no home oxygen normally. Pt reports a h/o asthma, sxms never this severe before. Denies pmH of blood clots. Pt admits to snorting fentanyl this am.     Nursing Notes were all reviewed and agreed with or any disagreements were addressed in the HPI. REVIEW OF SYSTEMS :      Review of Systems   Constitutional:  Negative for chills and fever. HENT: Negative. Negative for congestion, rhinorrhea, sinus pressure, sinus pain and sore throat. Eyes:  Negative for discharge, redness and visual disturbance. Respiratory:  Positive for shortness of breath. Negative for cough and chest tightness. Cardiovascular:  Negative for chest pain and palpitations. Gastrointestinal:  Negative for abdominal pain, constipation, diarrhea, nausea and vomiting.    Genitourinary:  Negative

## 2023-11-14 NOTE — H&P
Hospital Medicine History & Physical      Date of Admission: 11/14/2023    Date of Service:  Pt seen/examined on 11/14/2023     [x]Admitted to Inpatient with expected LOS greater than two midnights due to medical therapy. []Placed in Observation status. Chief Admission Complaint:  sob since this am     Presenting Admission History:      50 y.o. male who presented to [facility] with  above c/o . Marcia De La Paz PMHx significant for  non compliance with med follow up with PCP , chronic back pain and polysubstance abuse. He told he developed sudden onset of shortness of breath this morning. He denies dizziness or syncope at home. No chest pain fever or change in mental status. He has some cough and he is a smoker. He takes fentanyl every day - snorts. No complaints like nausea vomiting abdominal pain urinary complaints diarrhea constipation or any focal neurological symptoms. Assessment/Plan:      Current Principal Problem:  Pulmonary embolism and infarction (HCC)    Multiple pulmonary embolism-unprovoked-right heart strain, blood pressure is soft-admit to ICU, vascular surgery was consulted from the emergency room)planing for EKOS, NPO  Venous doppler LE    Acute hypoxic respiratory failure/respiratory acidosis-on 4 L oxygen secondary to above  Intensivist follows in the ICU  H/o Bronchial asthma-.   No evidence of exacerbation  Repeat VBG    Lactic acidosis-secondary to PE with poor perfusion, infection less likely patient received antibiotics in the emergency room 2/2 left lower lobe opacity , possibly infarction   Septic work-up sent from the emergency room    Elevated troponin-secondary to PE supply/demand mismatch-cardiology was consulted    Marked leukocytosis - 2/2 stress/ above - monitor     GARETT-gentle hydration and monitor BMP avoid nephrotoxic medications  Soft blood pressure may worsened the RF    Smoker - counseled     PSA - fentanyl / cocaine- watch for withdrawals- COWS prn    Pulm nodule -

## 2023-11-15 ENCOUNTER — APPOINTMENT (OUTPATIENT)
Dept: VASCULAR LAB | Age: 48
DRG: 173 | End: 2023-11-15
Payer: COMMERCIAL

## 2023-11-15 PROBLEM — I51.9 RIGHT VENTRICULAR DYSFUNCTION: Status: ACTIVE | Noted: 2023-11-15

## 2023-11-15 PROBLEM — J96.01 ACUTE RESPIRATORY FAILURE WITH HYPOXIA (HCC): Status: ACTIVE | Noted: 2023-11-15

## 2023-11-15 PROBLEM — R79.89 ELEVATED TROPONIN: Status: ACTIVE | Noted: 2023-11-15

## 2023-11-15 PROBLEM — R79.89 ELEVATED LACTIC ACID LEVEL: Status: ACTIVE | Noted: 2023-11-15

## 2023-11-15 PROBLEM — I27.20 PULMONARY HYPERTENSION (HCC): Status: ACTIVE | Noted: 2023-11-15

## 2023-11-15 PROBLEM — Z72.0 TOBACCO ABUSE: Status: ACTIVE | Noted: 2023-11-15

## 2023-11-15 LAB
ANION GAP SERPL CALCULATED.3IONS-SCNC: 14 MMOL/L (ref 3–16)
ANION GAP SERPL CALCULATED.3IONS-SCNC: 15 MMOL/L (ref 3–16)
ANTI-XA UNFRAC HEPARIN: 0.47 IU/ML (ref 0.3–0.7)
ANTI-XA UNFRAC HEPARIN: <0.1 IU/ML (ref 0.3–0.7)
APTT BLD: 29 SEC (ref 22.7–35.9)
BUN SERPL-MCNC: 12 MG/DL (ref 7–20)
BUN SERPL-MCNC: 13 MG/DL (ref 7–20)
CALCIUM SERPL-MCNC: 8.2 MG/DL (ref 8.3–10.6)
CALCIUM SERPL-MCNC: 8.3 MG/DL (ref 8.3–10.6)
CHLORIDE SERPL-SCNC: 105 MMOL/L (ref 99–110)
CHLORIDE SERPL-SCNC: 108 MMOL/L (ref 99–110)
CHOLEST SERPL-MCNC: 149 MG/DL (ref 0–199)
CO2 SERPL-SCNC: 17 MMOL/L (ref 21–32)
CO2 SERPL-SCNC: 18 MMOL/L (ref 21–32)
CREAT SERPL-MCNC: 1 MG/DL (ref 0.9–1.3)
CREAT SERPL-MCNC: 1.1 MG/DL (ref 0.9–1.3)
DEPRECATED RDW RBC AUTO: 14.5 % (ref 12.4–15.4)
DEPRECATED RDW RBC AUTO: 14.8 % (ref 12.4–15.4)
DEPRECATED RDW RBC AUTO: 14.9 % (ref 12.4–15.4)
FIBRINOGEN PPP-MCNC: 324 MG/DL (ref 243–550)
FIBRINOGEN PPP-MCNC: 335 MG/DL (ref 243–550)
FIBRINOGEN PPP-MCNC: 337 MG/DL (ref 243–550)
GFR SERPLBLD CREATININE-BSD FMLA CKD-EPI: >60 ML/MIN/{1.73_M2}
GFR SERPLBLD CREATININE-BSD FMLA CKD-EPI: >60 ML/MIN/{1.73_M2}
GLUCOSE SERPL-MCNC: 141 MG/DL (ref 70–99)
GLUCOSE SERPL-MCNC: 207 MG/DL (ref 70–99)
HCT VFR BLD AUTO: 36.2 % (ref 40.5–52.5)
HCT VFR BLD AUTO: 37 % (ref 40.5–52.5)
HCT VFR BLD AUTO: 37.7 % (ref 40.5–52.5)
HDLC SERPL-MCNC: 36 MG/DL (ref 40–60)
HGB BLD-MCNC: 11.8 G/DL (ref 13.5–17.5)
HGB BLD-MCNC: 12 G/DL (ref 13.5–17.5)
HGB BLD-MCNC: 12 G/DL (ref 13.5–17.5)
LACTATE BLDV-SCNC: 2.6 MMOL/L (ref 0.4–2)
LDLC SERPL CALC-MCNC: 84 MG/DL
MCH RBC QN AUTO: 28.4 PG (ref 26–34)
MCH RBC QN AUTO: 28.8 PG (ref 26–34)
MCH RBC QN AUTO: 29 PG (ref 26–34)
MCHC RBC AUTO-ENTMCNC: 31.7 G/DL (ref 31–36)
MCHC RBC AUTO-ENTMCNC: 32.3 G/DL (ref 31–36)
MCHC RBC AUTO-ENTMCNC: 32.6 G/DL (ref 31–36)
MCV RBC AUTO: 88.8 FL (ref 80–100)
MCV RBC AUTO: 89 FL (ref 80–100)
MCV RBC AUTO: 89.6 FL (ref 80–100)
PATH INTERP BLD-IMP: NORMAL
PLATELET # BLD AUTO: 175 K/UL (ref 135–450)
PLATELET # BLD AUTO: 195 K/UL (ref 135–450)
PLATELET # BLD AUTO: 206 K/UL (ref 135–450)
PMV BLD AUTO: 7.3 FL (ref 5–10.5)
PMV BLD AUTO: 7.5 FL (ref 5–10.5)
PMV BLD AUTO: 8.4 FL (ref 5–10.5)
POTASSIUM SERPL-SCNC: 4.7 MMOL/L (ref 3.5–5.1)
POTASSIUM SERPL-SCNC: 4.7 MMOL/L (ref 3.5–5.1)
RBC # BLD AUTO: 4.08 M/UL (ref 4.2–5.9)
RBC # BLD AUTO: 4.15 M/UL (ref 4.2–5.9)
RBC # BLD AUTO: 4.21 M/UL (ref 4.2–5.9)
SODIUM SERPL-SCNC: 137 MMOL/L (ref 136–145)
SODIUM SERPL-SCNC: 140 MMOL/L (ref 136–145)
TRIGL SERPL-MCNC: 143 MG/DL (ref 0–150)
TSH SERPL DL<=0.005 MIU/L-ACNC: 0.5 UIU/ML (ref 0.27–4.2)
VLDLC SERPL CALC-MCNC: 29 MG/DL
WBC # BLD AUTO: 15.2 K/UL (ref 4–11)
WBC # BLD AUTO: 15.9 K/UL (ref 4–11)
WBC # BLD AUTO: 16.6 K/UL (ref 4–11)

## 2023-11-15 PROCEDURE — 6360000002 HC RX W HCPCS: Performed by: CLINICAL NURSE SPECIALIST

## 2023-11-15 PROCEDURE — 83605 ASSAY OF LACTIC ACID: CPT

## 2023-11-15 PROCEDURE — 2000000000 HC ICU R&B

## 2023-11-15 PROCEDURE — 80061 LIPID PANEL: CPT

## 2023-11-15 PROCEDURE — 85520 HEPARIN ASSAY: CPT

## 2023-11-15 PROCEDURE — 2580000003 HC RX 258: Performed by: INTERNAL MEDICINE

## 2023-11-15 PROCEDURE — 85730 THROMBOPLASTIN TIME PARTIAL: CPT

## 2023-11-15 PROCEDURE — 2700000000 HC OXYGEN THERAPY PER DAY

## 2023-11-15 PROCEDURE — 93970 EXTREMITY STUDY: CPT

## 2023-11-15 PROCEDURE — 6370000000 HC RX 637 (ALT 250 FOR IP): Performed by: INTERNAL MEDICINE

## 2023-11-15 PROCEDURE — 85027 COMPLETE CBC AUTOMATED: CPT

## 2023-11-15 PROCEDURE — 80048 BASIC METABOLIC PNL TOTAL CA: CPT

## 2023-11-15 PROCEDURE — 99291 CRITICAL CARE FIRST HOUR: CPT | Performed by: INTERNAL MEDICINE

## 2023-11-15 PROCEDURE — 84443 ASSAY THYROID STIM HORMONE: CPT

## 2023-11-15 PROCEDURE — 6370000000 HC RX 637 (ALT 250 FOR IP): Performed by: NURSE PRACTITIONER

## 2023-11-15 PROCEDURE — 85384 FIBRINOGEN ACTIVITY: CPT

## 2023-11-15 PROCEDURE — 2580000003 HC RX 258: Performed by: SURGERY

## 2023-11-15 PROCEDURE — 36415 COLL VENOUS BLD VENIPUNCTURE: CPT

## 2023-11-15 PROCEDURE — 94761 N-INVAS EAR/PLS OXIMETRY MLT: CPT

## 2023-11-15 PROCEDURE — 6370000000 HC RX 637 (ALT 250 FOR IP): Performed by: SURGERY

## 2023-11-15 RX ORDER — HEPARIN SODIUM 10000 [USP'U]/100ML
1720 INJECTION, SOLUTION INTRAVENOUS CONTINUOUS
Status: DISCONTINUED | OUTPATIENT
Start: 2023-11-15 | End: 2023-11-15

## 2023-11-15 RX ORDER — HEPARIN SODIUM 1000 [USP'U]/ML
40 INJECTION, SOLUTION INTRAVENOUS; SUBCUTANEOUS PRN
Status: DISCONTINUED | OUTPATIENT
Start: 2023-11-15 | End: 2023-11-15

## 2023-11-15 RX ORDER — HEPARIN SODIUM 1000 [USP'U]/ML
80 INJECTION, SOLUTION INTRAVENOUS; SUBCUTANEOUS PRN
Status: DISCONTINUED | OUTPATIENT
Start: 2023-11-15 | End: 2023-11-15

## 2023-11-15 RX ORDER — VALSARTAN 80 MG/1
40 TABLET ORAL DAILY
Status: DISCONTINUED | OUTPATIENT
Start: 2023-11-15 | End: 2023-11-16

## 2023-11-15 RX ORDER — FAMOTIDINE 20 MG/1
20 TABLET, FILM COATED ORAL 2 TIMES DAILY
Status: DISCONTINUED | OUTPATIENT
Start: 2023-11-15 | End: 2023-11-16 | Stop reason: HOSPADM

## 2023-11-15 RX ADMIN — APIXABAN 10 MG: 5 TABLET, FILM COATED ORAL at 16:32

## 2023-11-15 RX ADMIN — Medication 10 ML: at 09:03

## 2023-11-15 RX ADMIN — VALSARTAN 40 MG: 80 TABLET, FILM COATED ORAL at 13:09

## 2023-11-15 RX ADMIN — HEPARIN SODIUM 1720 UNITS/HR: 10000 INJECTION, SOLUTION INTRAVENOUS at 08:58

## 2023-11-15 RX ADMIN — MUPIROCIN: 20 OINTMENT TOPICAL at 10:30

## 2023-11-15 RX ADMIN — ASPIRIN 81 MG: 81 TABLET, CHEWABLE ORAL at 09:04

## 2023-11-15 RX ADMIN — Medication 10 ML: at 20:09

## 2023-11-15 RX ADMIN — SODIUM CHLORIDE: 9 INJECTION, SOLUTION INTRAVENOUS at 19:15

## 2023-11-15 RX ADMIN — FAMOTIDINE 20 MG: 20 TABLET, FILM COATED ORAL at 20:09

## 2023-11-15 RX ADMIN — FAMOTIDINE 20 MG: 20 TABLET, FILM COATED ORAL at 10:27

## 2023-11-15 ASSESSMENT — PAIN SCALES - GENERAL: PAINLEVEL_OUTOF10: 0

## 2023-11-15 NOTE — CARE COORDINATION
Patient and/or Patient Representative Agree with the Discharge Plan?       Amanda Parsons RN  Case Management Department  Ph: [phone] Fax: [phone]

## 2023-11-15 NOTE — FLOWSHEET NOTE
Phoned Dr. Zenaida Marsh regarding blood pressure last bp 160/110.  will review chart and place orders.

## 2023-11-15 NOTE — CONSULTS
CARDIOLOGY CONSULTATION        Patient Name: Danitza Herrera  Date of admission: 11/14/2023 10:26 AM  Admission Dx: No admission diagnoses are documented for this encounter. Requesting Physician: No admitting provider for patient encounter. Primary Care physician: Bill Rodriguez MD    Reason for Consultation/Chief Complaint: Massive pulmonary    History of Present Illness:     Danitza Herrera is a 50 y.o. patient with primary history notable for histoplasmosis, complicated with fibrosing mediastinitis, asthma and suspected COPD who presented to the hospital with complaints of acute onset shortness of breath    ED course/Vital signs on presentation: Hypotensive with SBP 70s. Tachycardic. EKG shows sinus tachycardia with heart rate 100 beats minute, S1Q3T3 pattern, T wave inversions inferior/anterior leads. High-sensitivity troponin 366 from 55. Normal proBNP. Elevated lactate near 5. Creatinine 1.5 from 1.3 at presentation. Blood cultures pending. CT chest showed multiple acute bilateral pulmonary emboli with evidence of right ventricular strain. Indeterminate bilateral pulmonary nodules. Patient is evaluated in the ED before today. He is noted with low-normal blood pressure but adequate MAP. Oxygen 97-99% on 4 L nasal cannula. He notes he has chronic shortness of breath with walking inclines/stairs. He is limited to 1 block before becomes dyspneic he states chronically. He is  by trade and has had no problem keeping up with his work/peers. Today, he notes acute onset shortness of breath while walking the door following a short car trip from [facility] to [facility]. Notes severe, acute onset, with associated lightheadedness. He has had no chest pain or pressure he notes mild back discomfort yesterday as well as right leg pain. He has noticed no swelling, orthopnea or paroxysmal nocturnal dyspnea. No syncope. Mild palpitations. Mild abdominal distention.     The patient takes
Critical Care consult has been processed.
Pharmacy to Manage Heparin protocol (VTE, DVT, PE) \"High Dose\"    Weight: 95.3    Bolus: 6400 units  Drip: 1450 units/hr    Anti-Xa level due tonight @ [address] [place] [address]
Pharmacy to Manage Heparin protocol (VTE, DVT, PE) \"High Dose\"    Weight: 95.3    Bolus: 6400 units  Drip: 1450 units/hr    Anti-Xa level due tonight @ [address] [place] [address], [address] [facility] [address] to for Thrombectomy , Dr. Anupam Powers ordered 2 drips at 250 unit/hr, will follow up post OR regarding weight based  heparin drip order. Payton Tafoya/Rph. 11/14/23 4:37 PM EST    - Weight based heparin drip d/c'd . Payton Tafoya/Rph. 11/14/23 4:58 PM EST        Pharmacy to Manage Heparin Infusion per [facility]    Dx: PE s/p EKOS  Pt wt = 95.3 kg    Heparin (weight-based) Infusion: VTE/DVT/PE  Heparin infusion at 18 units/kg/hr (recommended max initial rate: 2100 units/hr). Recheck anti-Xa (unless aPTT being used) in 6 hours. Goal anti-Xa 0.3-0.7 IU/mL    Pharmacy to manage Heparin - contact for questions. Heparin 80 units/kg IV x 1 (max 10,000 units), then 18 units/kg/hr (recommended max  initial rate 2100 units/hr). Adjust infusion rate based off anti-Xa results below. anti-Xa < 0.1  Heparin 80 units/kg bolus  Increase infusion by 4 units/kg/hr  anti-Xa 0.1-0.29    Heparin 40 units/kg bolus Increase infusion by 2 units/kg/hr  anti-Xa 0.3-0.7  No bolus No change   anti-Xa 0.71-0.8    No bolus Decrease infusion by 1 units/kg/hr  anti-Xa 0.81-0.99   No bolus Decrease infusion by 2 units/kg/hr  anti-Xa 1 or more  Hold heparin for 1 hour Decrease infusion by 3 units/kg/hr    Obtain anti-Xa 6 hours after bolus and 6 hours after any dose change until two consecutive therapeutic anti-Xa are achieved- then daily. Nan Jacques.  Lucas TracyD, D.[facility]   11/15/2023 8:10 AM
intact. MEDICAL DECISION MAKING/TESTING    Images personally reviewed and interpreted. CTA:    IMPRESSION:  1. Multiple acute bilateral pulmonary emboli with evidence of right  ventricular strain. 2. Indeterminate bilateral pulmonary nodules. Follow-up nonemergent PET scan  recommended for further evaluation. Echo:  Summary   Patient tachycardic throughout study. Hyperdynamic left ventricular systolic function with ejection fraction of   60-65%. Flattened in systole and diastole consistent with right ventricular pressure   and volume overload. Reduced left ventricular size due to right ventricular pathology with mild   concentric left ventricular hypertrophy. Left ventricular diastolic filling pressure is normal   The right ventricle is severely enlarged. Right ventricular systolic function is severely reduced. No obvious thrombus or clot is seen in the right ventricle. The right atrium is moderately dilated. Moderate tricuspid regurgitation. Systolic pulmonic artery pressure (SPAP) is estimated at 47 mmHg consistent   with moderate pulmonary hypertension (Right atrial pressure of 15 mmHg). Labs:   CBC:   Recent Labs     11/14/23  1033   WBC 18.0*   HGB 13.9   HCT 42.4   MCV 88.6        BMP:   Recent Labs     11/14/23  1033 11/14/23  1155    137   K 3.6 3.8   CL 97* 99   CO2 26 27   BUN 9 10   CREATININE 1.3 1.5*   CALCIUM 9.1 8.8     Cardiac Enzymes: No results for input(s): \"CKTOTAL\", \"CKMB\", \"CKMBINDEX\", \"TROPONINI\" in the last 72 hours.   PT/INR:   Recent Labs     11/14/23  1033   PROTIME 13.5   INR 1.03     APTT:   Recent Labs     11/14/23  1033   APTT 25.2     Liver Profile:  Lab Results   Component Value Date/Time    AST 31 11/14/2023 10:33 AM    ALT 35 11/14/2023 10:33 AM    BILITOT 0.3 11/14/2023 10:33 AM    ALKPHOS 100 11/14/2023 10:33 AM   No results found for: \"CHOL\", \"HDL\", \"TRIG\"  TSH:  No results found for: \"TSH\"  UA:   Lab Results   Component Value
as well as risks and benefits of proposed treatment were discussed. Tobacco use was discussed with the patient and educated on the negative effects. Thank you for allowing to us to participate in the care or Armando Ibrahim. Please call with questions.          John Jaffe MD, [facility]  Interventional Cardiology  [address] [place] [address]  11/14/2023  [phone]      Inadvertent computerized transcription errors may be present
within  the medial segment of the right middle lobe as well as noncalcified 6 mm  nodules within the lung apices. A few additional scattered 2-3 mm nodules  are present. .  There is no acute lobar consolidation, pneumothorax or  effusion. CT scan from [facility] in 2013 shows a similar looking lung nodule which was 2.2 x 1.7 cm  We will ask radiology to compare    Fibrosing mediastinitis  CT scan shows evidence of this  We will watch  Nothing to do at this time      Obstructive sleep apnea  Patient has a high likelihood of obstructive sleep apnea along with symptomatology and [facility] of 9/24  Would probably recommend an outpatient sleep study        Gastrointestinal   Ok to eat    Will start on   pepcid    Endo  We will check TSH    Elevated blood sugars, will follow    Renal  Normal creatinine  Elevated lactic acid will need repeat      Urine drug screen positive for opioids, cocaine and fentanyl    Prophylaxis  Heparin drip      Lines  Peripheral IV        Hematology  Elevated white count  Elevated eosinophil count of 1100      Code Status: Full      Thank you for the consult, will follow      Discussed at multidisciplinary rounds with dietitian, pharmacy and [address] [place] [address] Provided: This patient had a critical illness or injury that acutely impaired one or more vital organ systems such that there was a high probability of imminent or life-threatening deterioration in the patient's condition. This required high complexity decision-making to assess, manipulate, and support vital system function(s) to treat single or multiple vital organ system failure and/or to prevent further life-threatening deterioration of the patient's condition.  Total attending physician time, excluding unbundled procedures, spent at the bedside, and away from the bedside but on the unit or floor, reviewing laboratory test results, discussing care with medical staff, and discussing care with family when the patient

## 2023-11-15 NOTE — FLOWSHEET NOTE
Venous sheaths x 2 removed, tolerated well. Manual pressure held at sites for 15 minutes. Vital signs remained stable. Guaze and transparent occlusive dressing applied, no bleeding hematoma, or ecchymosis present.

## 2023-11-15 NOTE — OP NOTE
[facility], [address]                                OPERATIVE REPORT    PATIENT NAME: Danitza Herrera                     :        1975  MED REC NO:   [id number]                          ROOM:       [id number]  ACCOUNT NO:   [de-identified]                           ADMIT DATE: 2023  PROVIDER:     Evelin Zaragoza MD    DATE OF PROCEDURE:  2023    PREOPERATIVE DIAGNOSIS:  Bilateral acute pulmonary emboli with acute cor  pulmonale. POSTOPERATIVE DIAGNOSIS:  Bilateral acute pulmonary emboli with acute  cor pulmonale. OPERATION PERFORMED:  1. Ultrasound-guided left femoral venous access x2  2. Placement of catheters into the right and left subsegmental  pulmonary arteries. 3.  Placement of EKOS thrombolytic catheters into the right and left  pulmonary arteries. 4.  Institution of arterial thrombolysis in the bilateral pulmonary  arteries. SURGEON:  Evelin Zaragoza MD    ANESTHESIA:  Local with moderate monitored sedation. INDICATIONS:  The patient is a 75-year-old male who presented with acute  onset of shortness of breath. CT angiogram was performed showing  diffuse bilateral pulmonary emboli in distal main segmental and  subsegmental branches. I did not feel that the clot burden was proximal  enough to perform a pulmonary thrombectomy _____ given the severity of  the patient's symptoms, I felt a pulmonary thrombolysis was indicated. Therefore, he is brought to the angio suite for this procedure. OPERATIVE PROCEDURE:  The patient was brought to the angio suite, placed  in the supine position. Under my direct supervision, Versed and  fentanyl were administered for moderate sedation. The patient was  monitored by an independent trained nurse observer using continuous  blood pressure, EKG and pulse oximetry. I spent 20 minutes face-to-face  with the patient providing and directing sedation.   The patient

## 2023-11-15 NOTE — FLOWSHEET NOTE
Dr. Maribel Carrasco at bedside and gave instruction to notify Dr Charla Donato that patient can start eliquis tonight and then heparin can be discontinued due to US result and lab having difficulty drawing patient. Notified Dr Charla Donato, acknowledged and gave no new orders at this time.

## 2023-11-16 ENCOUNTER — TELEPHONE (OUTPATIENT)
Dept: PULMONOLOGY | Age: 48
End: 2023-11-16

## 2023-11-16 VITALS
DIASTOLIC BLOOD PRESSURE: 89 MMHG | BODY MASS INDEX: 31.1 KG/M2 | SYSTOLIC BLOOD PRESSURE: 129 MMHG | HEIGHT: 69 IN | TEMPERATURE: 97.7 F | WEIGHT: 210 LBS | HEART RATE: 62 BPM | OXYGEN SATURATION: 99 % | RESPIRATION RATE: 14 BRPM

## 2023-11-16 LAB
ANION GAP SERPL CALCULATED.3IONS-SCNC: 9 MMOL/L (ref 3–16)
BUN SERPL-MCNC: 9 MG/DL (ref 7–20)
CALCIUM SERPL-MCNC: 8 MG/DL (ref 8.3–10.6)
CHLORIDE SERPL-SCNC: 108 MMOL/L (ref 99–110)
CO2 SERPL-SCNC: 23 MMOL/L (ref 21–32)
CREAT SERPL-MCNC: 0.9 MG/DL (ref 0.9–1.3)
DEPRECATED RDW RBC AUTO: 15.1 % (ref 12.4–15.4)
FIBRINOGEN PPP-MCNC: 296 MG/DL (ref 243–550)
GFR SERPLBLD CREATININE-BSD FMLA CKD-EPI: >60 ML/MIN/{1.73_M2}
GLUCOSE SERPL-MCNC: 114 MG/DL (ref 70–99)
HCT VFR BLD AUTO: 34 % (ref 40.5–52.5)
HGB BLD-MCNC: 11.4 G/DL (ref 13.5–17.5)
MCH RBC QN AUTO: 29.4 PG (ref 26–34)
MCHC RBC AUTO-ENTMCNC: 33.5 G/DL (ref 31–36)
MCV RBC AUTO: 87.7 FL (ref 80–100)
PLATELET # BLD AUTO: 174 K/UL (ref 135–450)
PMV BLD AUTO: 7.2 FL (ref 5–10.5)
POTASSIUM SERPL-SCNC: 3.8 MMOL/L (ref 3.5–5.1)
RBC # BLD AUTO: 3.87 M/UL (ref 4.2–5.9)
SODIUM SERPL-SCNC: 140 MMOL/L (ref 136–145)
WBC # BLD AUTO: 10.1 K/UL (ref 4–11)

## 2023-11-16 PROCEDURE — 6370000000 HC RX 637 (ALT 250 FOR IP): Performed by: INTERNAL MEDICINE

## 2023-11-16 PROCEDURE — 6370000000 HC RX 637 (ALT 250 FOR IP): Performed by: NURSE PRACTITIONER

## 2023-11-16 PROCEDURE — 2580000003 HC RX 258: Performed by: SURGERY

## 2023-11-16 PROCEDURE — 85384 FIBRINOGEN ACTIVITY: CPT

## 2023-11-16 PROCEDURE — 36415 COLL VENOUS BLD VENIPUNCTURE: CPT

## 2023-11-16 PROCEDURE — 99233 SBSQ HOSP IP/OBS HIGH 50: CPT | Performed by: INTERNAL MEDICINE

## 2023-11-16 PROCEDURE — 85027 COMPLETE CBC AUTOMATED: CPT

## 2023-11-16 PROCEDURE — 80048 BASIC METABOLIC PNL TOTAL CA: CPT

## 2023-11-16 RX ORDER — FAMOTIDINE 20 MG/1
20 TABLET, FILM COATED ORAL 2 TIMES DAILY
Qty: 60 TABLET | Refills: 0 | Status: SHIPPED | OUTPATIENT
Start: 2023-11-16

## 2023-11-16 RX ORDER — VALSARTAN 80 MG/1
80 TABLET ORAL 2 TIMES DAILY
Status: DISCONTINUED | OUTPATIENT
Start: 2023-11-16 | End: 2023-11-16 | Stop reason: HOSPADM

## 2023-11-16 RX ORDER — VALSARTAN 80 MG/1
80 TABLET ORAL 2 TIMES DAILY
Qty: 60 TABLET | Refills: 0 | Status: SHIPPED | OUTPATIENT
Start: 2023-11-16

## 2023-11-16 RX ORDER — ASPIRIN 81 MG/1
81 TABLET, CHEWABLE ORAL DAILY
Qty: 30 TABLET | Refills: 3 | Status: CANCELLED | OUTPATIENT
Start: 2023-11-17

## 2023-11-16 RX ORDER — VALSARTAN 40 MG/1
40 TABLET ORAL DAILY
Qty: 30 TABLET | Refills: 0 | Status: CANCELLED | OUTPATIENT
Start: 2023-11-17

## 2023-11-16 RX ORDER — FAMOTIDINE 20 MG/1
20 TABLET, FILM COATED ORAL 2 TIMES DAILY
Qty: 60 TABLET | Refills: 0 | Status: CANCELLED | OUTPATIENT
Start: 2023-11-16

## 2023-11-16 RX ADMIN — Medication 10 ML: at 07:40

## 2023-11-16 RX ADMIN — ASPIRIN 81 MG: 81 TABLET, CHEWABLE ORAL at 07:39

## 2023-11-16 RX ADMIN — VALSARTAN 40 MG: 80 TABLET, FILM COATED ORAL at 07:39

## 2023-11-16 RX ADMIN — APIXABAN 10 MG: 5 TABLET, FILM COATED ORAL at 07:39

## 2023-11-16 RX ADMIN — FAMOTIDINE 20 MG: 20 TABLET, FILM COATED ORAL at 07:39

## 2023-11-16 NOTE — DISCHARGE SUMMARY
evaluation. Findings were discussed with Russ Horta at 12:25 pm on 11/14/2023. VL Extremity Venous Bilateral    Result Date: 11/15/2023  Lower Extremities DVT Study  Demographics   Patient Name        Henry Liao   Date of Study       11/15/2023     Gender               Male   Patient Number      [id number]     Date of Birth        1975   Visit Number        [id number]      Age                  50 year(s)   Accession Number    [id number]     Room Number          6810   Corporate ID        [id number]       Sonographer          Maninder Hearn [facility]   Ordering Physician  Rishi Muniz MD  Interpreting         [facility]                                     Physician            Readers                                                          Charla Perez MD  Procedure Type of Study:   Veins:Lower Extremities DVT Study, VASC EXTREMITY VENOUS DUPLEX BILATERAL. Vascular Sonographer Report  Indications for Study:Suspected pulmonary embolism. Additional Indications:Patient denies any symptoms involving the bilateral lower extremities; however, patient is positive for PE. Venous Duplex Scan: B-mode imaging of the deep and superficial veins, with compression maneuvers, including color and Doppler spectral waveform analysis. Impressions Right Impression No evidence of deep or superficial venous thrombosis involving the right lower extremity. Left Impression Technically difficult and limited study due to bandage located at the left groin area. No evidence of deep or superficial venous thrombosis involving the left lower extremity. Conclusions   Summary   No evidence of deep or superficial venous thrombosis involving the bilateral  lower extremities. Signature   ------------------------------------------------------------------  Electronically signed by Charla Perez MD (Interpreting  physician) on 11/15/2023 at 04:41 PM  ------------------------------------------------------------------  Patient Status:Routine.

## 2023-11-18 LAB
BACTERIA BLD CULT ORG #2: NORMAL
BACTERIA BLD CULT: NORMAL

## 2023-11-28 ENCOUNTER — TELEPHONE (OUTPATIENT)
Dept: PULMONOLOGY | Age: 48
End: 2023-11-28

## 2023-11-28 NOTE — TELEPHONE ENCOUNTER
Patient did not show for hospital f/u  with Florencia Huff on 11/28/23. This is patient's first no show. Patient did not reschedule.  LVM to Reschedule

## 2023-11-28 NOTE — TELEPHONE ENCOUNTER
Patient did not show for HFU  with Alfredito Guadalupe on 11/28/23. Reason:  No show    This is patient's first no show. Left message for the patient to call the office back to reschedule.

## 2023-12-07 ENCOUNTER — TELEPHONE (OUTPATIENT)
Dept: CASE MANAGEMENT | Age: 48
End: 2023-12-07

## 2023-12-07 NOTE — TELEPHONE ENCOUNTER
Imaging report CT Chest 11/14/23 with f/u imaging recommendations sent to Neris Wiley MD    [facility]([email or web address]

## 2023-12-15 PROBLEM — R79.89 ELEVATED TROPONIN: Status: RESOLVED | Noted: 2023-11-15 | Resolved: 2023-12-15

## 2024-01-24 ENCOUNTER — TELEPHONE (OUTPATIENT)
Dept: CARDIOLOGY CLINIC | Age: 49
End: 2024-01-24

## 2024-01-24 DIAGNOSIS — I27.20 PULMONARY HYPERTENSION (HCC): ICD-10-CM

## 2024-01-24 DIAGNOSIS — I26.99 PULMONARY EMBOLISM AND INFARCTION (HCC): Primary | ICD-10-CM

## 2024-01-24 DIAGNOSIS — I51.9 RIGHT VENTRICULAR DYSFUNCTION: ICD-10-CM

## 2024-01-24 RX ORDER — VALSARTAN 160 MG/1
160 TABLET ORAL DAILY
Qty: 30 TABLET | Refills: 0 | Status: SHIPPED | OUTPATIENT
Start: 2024-01-24

## 2024-01-24 NOTE — TELEPHONE ENCOUNTER
Pt spouse called and stated that when pt was discharged from [facility], stated that medication refill would be sent to [facility] pharmacy.  Pt contacted pharmacy and was advised that refills were not there.  [facility] 2/23/24 due to not having a  for grandson.  Please send medication refills for Valsartan (Diovan) 160mg and Apixaban (Eliquis) 5mg to       [facility] - [place], [place] - [address] [place] [address] - P [phone] - F [phone]  [address] [place], [place] [id number]  Phone: [phone]  Fax: [phone]

## 2024-01-24 NOTE — TELEPHONE ENCOUNTER
Pt sent a request to make a sooner FXW appt, scheduled right now for 2/23/24, for low blood pressure. He stated his blood pressure is consistently staying low. Please advise.    (ELAINE I checked KEVIN's schedule (saw pt in hospital in November 2023) to try to work pt in but her next available is 2/13/24)

## 2024-01-24 NOTE — TELEPHONE ENCOUNTER
Rx pending sign off for review    Pt was seen in the hospital 11/2023 by FXW. Pt has no showed for multiple OV's.   Next OV FXW 2/23/2024

## 2024-02-07 ENCOUNTER — TELEPHONE (OUTPATIENT)
Dept: CARDIOLOGY CLINIC | Age: 49
End: 2024-02-07

## 2024-02-07 NOTE — TELEPHONE ENCOUNTER
Pt needs ECHO before appt on 2/9/24. Pt was scheduled on 1/18/2024 but said they didn't know about it so did not get done. Then said they did not know how to get hold of our office to R/S. Pt wanting to get order changed to STAT so they can get it done next couple days. Please advise.

## 2024-02-08 NOTE — TELEPHONE ENCOUNTER
Echo can be done after the appointment or if he wants to reschedule the appointment to a couple weeks from now that is fine to.  Echo does not necessarily need to be done before the appointment.

## 2024-02-08 NOTE — TELEPHONE ENCOUNTER
Called and spoke with pt, relayed message. He v/u and will keep ov scheduled for 02/09/24 with fxw, and will call and get echo scheduled

## 2024-02-27 ENCOUNTER — HOSPITAL ENCOUNTER (OUTPATIENT)
Dept: NON INVASIVE DIAGNOSTICS | Age: 49
Discharge: HOME | End: 2024-02-27
Payer: COMMERCIAL

## 2024-02-27 DIAGNOSIS — I26.09 ACUTE PULMONARY EMBOLISM WITH ACUTE COR PULMONALE, UNSPECIFIED PULMONARY EMBOLISM TYPE (HCC): ICD-10-CM

## 2024-02-27 PROCEDURE — 93306 TTE W/DOPPLER COMPLETE: CPT

## 2024-02-28 ENCOUNTER — TELEPHONE (OUTPATIENT)
Dept: CARDIOLOGY CLINIC | Age: 49
End: 2024-02-28

## 2024-02-28 ENCOUNTER — PATIENT MESSAGE (OUTPATIENT)
Dept: CARDIOLOGY CLINIC | Age: 49
End: 2024-02-28

## 2024-02-28 DIAGNOSIS — Q23.1 BICUSPID AORTIC VALVE: Primary | ICD-10-CM

## 2024-02-28 DIAGNOSIS — M79.5 FOREIGN BODY (FB) IN SOFT TISSUE: Primary | ICD-10-CM

## 2024-02-28 NOTE — TELEPHONE ENCOUNTER
----- Message from Hermes Mcrae MD sent at 2/28/2024 10:33 AM EST -----  Let patient know echo test shows normal heart function, however it does show a possible bicuspid aortic valve, and as such, a cardiac mri would be helpful to further assess this, lets order that if pt agreeable to do that test. thanks.

## 2024-02-28 NOTE — TELEPHONE ENCOUNTER
Spoke with pt. He V/U of test results. Test ordered. Scheduling number sent to pt via [employer] per pt request

## 2024-02-29 NOTE — TELEPHONE ENCOUNTER
Jigna from [facility] stated that they scheduled for first available in April. Jigna stated that pt thought that this order needed to be stat? Please advise    Jigna also stated that pt has metal in his eye and they will need an order for an xray or xray orbits placed as well. Pt did tell Jigna that the metal was removed. Please advise.

## 2024-03-05 NOTE — TELEPHONE ENCOUNTER
I see absolutely no reason for MRI to be started.  Please reassure patient that this is only to confirm or rule out bicuspid aortic valve which shows only slightly suspected based on echocardiogram although it may not be the case.  Otherwise there is no narrowing of the valve order an EKG in the valve which can be a late complication if he truly has a bicuspid valve.  Hence, my suspicion that this is true bicuspid valve is low but regardless MRI does not need to happen on an urgent basis.  April dates are fine.

## 2024-03-05 NOTE — TELEPHONE ENCOUNTER
FXW, SRARACELIS ordered a Cardiac MRI in your absence based on echo results from 2/28/2024. Please advise if the Cardiac MRI should be ordered as STAT? Please advise if pt needs an xray for metal in his eye prior to Cardiac MRI being completed?    Cardiac MRI scheduled 4/10/2024  LOV FXW 2/9/2024  Next OV FXW 3/11/2024

## 2024-03-06 NOTE — TELEPHONE ENCOUNTER
Please advise if pending order is the correct order you need    Pt.'s wife informed of FXW message

## 2024-03-07 NOTE — TELEPHONE ENCOUNTER
KUB order is correct.  Please also order x-ray of the head to rule out foreign metal body in his eye if not order already.  Thank you

## 2024-03-20 ENCOUNTER — APPOINTMENT (OUTPATIENT)
Dept: VASCULAR LAB | Age: 49
End: 2024-03-20
Payer: COMMERCIAL

## 2024-03-20 ENCOUNTER — APPOINTMENT (OUTPATIENT)
Dept: CT IMAGING | Age: 49
End: 2024-03-20
Payer: COMMERCIAL

## 2024-03-20 ENCOUNTER — HOSPITAL ENCOUNTER (INPATIENT)
Age: 49
LOS: 1 days | Discharge: HOME | End: 2024-03-21
Attending: STUDENT IN AN ORGANIZED HEALTH CARE EDUCATION/TRAINING PROGRAM | Admitting: STUDENT IN AN ORGANIZED HEALTH CARE EDUCATION/TRAINING PROGRAM
Payer: COMMERCIAL

## 2024-03-20 ENCOUNTER — APPOINTMENT (OUTPATIENT)
Dept: CARDIAC CATH/INVASIVE PROCEDURES | Age: 49
End: 2024-03-20
Payer: COMMERCIAL

## 2024-03-20 DIAGNOSIS — Z91.148 NONCOMPLIANCE WITH MEDICATION REGIMEN: ICD-10-CM

## 2024-03-20 DIAGNOSIS — I27.20 PULMONARY HYPERTENSION (HCC): ICD-10-CM

## 2024-03-20 DIAGNOSIS — I26.99 BILATERAL PULMONARY EMBOLISM (HCC): Primary | ICD-10-CM

## 2024-03-20 DIAGNOSIS — F19.90 DRUG USE: ICD-10-CM

## 2024-03-20 DIAGNOSIS — I51.9 RIGHT VENTRICULAR DYSFUNCTION: ICD-10-CM

## 2024-03-20 LAB
ALBUMIN SERPL-MCNC: 3.7 G/DL (ref 3.4–5)
ALBUMIN/GLOB SERPL: 1 {RATIO} (ref 1.1–2.2)
ALP SERPL-CCNC: 94 U/L (ref 40–129)
ALT SERPL-CCNC: 13 U/L (ref 10–40)
ANION GAP SERPL CALCULATED.3IONS-SCNC: 12 MMOL/L (ref 3–16)
ANTI-XA UNFRAC HEPARIN: 0.69 IU/ML (ref 0.3–0.7)
ANTI-XA UNFRAC HEPARIN: 0.77 IU/ML (ref 0.3–0.7)
APTT BLD: 108.3 SEC (ref 22.7–35.9)
APTT BLD: 27.6 SEC (ref 22.7–35.9)
APTT BLD: 87.9 SEC (ref 22.7–35.9)
AST SERPL-CCNC: 15 U/L (ref 15–37)
BASE EXCESS BLDV CALC-SCNC: 1 MMOL/L (ref -3–3)
BASOPHILS # BLD: 0.1 K/UL (ref 0–0.2)
BASOPHILS NFR BLD: 0.4 %
BILIRUB SERPL-MCNC: 0.5 MG/DL (ref 0–1)
BUN SERPL-MCNC: 16 MG/DL (ref 7–20)
CALCIUM SERPL-MCNC: 9.1 MG/DL (ref 8.3–10.6)
CHLORIDE SERPL-SCNC: 100 MMOL/L (ref 99–110)
CO2 BLDV-SCNC: 29 MMOL/L
CO2 SERPL-SCNC: 25 MMOL/L (ref 21–32)
COHGB MFR BLDV: 3.5 % (ref 0–1.5)
CREAT SERPL-MCNC: 1.2 MG/DL (ref 0.9–1.3)
DEPRECATED RDW RBC AUTO: 14.5 % (ref 12.4–15.4)
EKG ATRIAL RATE: 90 BPM
EKG DIAGNOSIS: NORMAL
EKG P AXIS: 63 DEGREES
EKG P-R INTERVAL: 202 MS
EKG Q-T INTERVAL: 364 MS
EKG QRS DURATION: 74 MS
EKG QTC CALCULATION (BAZETT): 445 MS
EKG R AXIS: 75 DEGREES
EKG T AXIS: 13 DEGREES
EKG VENTRICULAR RATE: 90 BPM
EOSINOPHIL # BLD: 0.2 K/UL (ref 0–0.6)
EOSINOPHIL NFR BLD: 1.4 %
EST. AVERAGE GLUCOSE BLD GHB EST-MCNC: 114 MG/DL
GFR SERPLBLD CREATININE-BSD FMLA CKD-EPI: >60 ML/MIN/{1.73_M2}
GLUCOSE SERPL-MCNC: 117 MG/DL (ref 70–99)
HBA1C MFR BLD: 5.6 %
HCO3 BLDV-SCNC: 27.2 MMOL/L (ref 23–29)
HCT VFR BLD AUTO: 41.7 % (ref 40.5–52.5)
HGB BLD-MCNC: 13.8 G/DL (ref 13.5–17.5)
LYMPHOCYTES # BLD: 4 K/UL (ref 1–5.1)
LYMPHOCYTES NFR BLD: 30.4 %
MCH RBC QN AUTO: 29.3 PG (ref 26–34)
MCHC RBC AUTO-ENTMCNC: 33.2 G/DL (ref 31–36)
MCV RBC AUTO: 88.2 FL (ref 80–100)
METHGB MFR BLDV: 0 %
MONOCYTES # BLD: 0.7 K/UL (ref 0–1.3)
MONOCYTES NFR BLD: 5.3 %
NEUTROPHILS # BLD: 8.2 K/UL (ref 1.7–7.7)
NEUTROPHILS NFR BLD: 62.5 %
O2 THERAPY: ABNORMAL
PCO2 BLDV: 48.6 MMHG (ref 40–50)
PH BLDV: 7.37 [PH] (ref 7.35–7.45)
PLATELET # BLD AUTO: 238 K/UL (ref 135–450)
PMV BLD AUTO: 7.2 FL (ref 5–10.5)
PO2 BLDV: 25.2 MMHG (ref 25–40)
POTASSIUM SERPL-SCNC: 4.2 MMOL/L (ref 3.5–5.1)
PROT SERPL-MCNC: 7.3 G/DL (ref 6.4–8.2)
RBC # BLD AUTO: 4.72 M/UL (ref 4.2–5.9)
REASON FOR REJECTION: NORMAL
REASON FOR REJECTION: NORMAL
REJECTED TEST: NORMAL
REJECTED TEST: NORMAL
SAO2 % BLDV: 48 %
SODIUM SERPL-SCNC: 137 MMOL/L (ref 136–145)
TROPONIN, HIGH SENSITIVITY: 27 NG/L (ref 0–22)
TROPONIN, HIGH SENSITIVITY: 32 NG/L (ref 0–22)
TROPONIN, HIGH SENSITIVITY: 46 NG/L (ref 0–22)
TROPONIN, HIGH SENSITIVITY: 80 NG/L (ref 0–22)
WBC # BLD AUTO: 13.1 K/UL (ref 4–11)

## 2024-03-20 PROCEDURE — C1769 GUIDE WIRE: HCPCS | Performed by: SURGERY

## 2024-03-20 PROCEDURE — 99254 IP/OBS CNSLTJ NEW/EST MOD 60: CPT | Performed by: SURGERY

## 2024-03-20 PROCEDURE — 82803 BLOOD GASES ANY COMBINATION: CPT

## 2024-03-20 PROCEDURE — 6370000000 HC RX 637 (ALT 250 FOR IP): Performed by: INTERNAL MEDICINE

## 2024-03-20 PROCEDURE — 2580000003 HC RX 258: Performed by: STUDENT IN AN ORGANIZED HEALTH CARE EDUCATION/TRAINING PROGRAM

## 2024-03-20 PROCEDURE — 71260 CT THORAX DX C+: CPT

## 2024-03-20 PROCEDURE — 1200000000 HC SEMI PRIVATE

## 2024-03-20 PROCEDURE — 36014 PLACE CATHETER IN ARTERY: CPT | Performed by: SURGERY

## 2024-03-20 PROCEDURE — 2500000003 HC RX 250 WO HCPCS

## 2024-03-20 PROCEDURE — 37184 PRIM ART M-THRMBC 1ST VSL: CPT | Performed by: SURGERY

## 2024-03-20 PROCEDURE — 6370000000 HC RX 637 (ALT 250 FOR IP): Performed by: STUDENT IN AN ORGANIZED HEALTH CARE EDUCATION/TRAINING PROGRAM

## 2024-03-20 PROCEDURE — 99285 EMERGENCY DEPT VISIT HI MDM: CPT

## 2024-03-20 PROCEDURE — 6360000002 HC RX W HCPCS: Performed by: STUDENT IN AN ORGANIZED HEALTH CARE EDUCATION/TRAINING PROGRAM

## 2024-03-20 PROCEDURE — 85730 THROMBOPLASTIN TIME PARTIAL: CPT

## 2024-03-20 PROCEDURE — 85520 HEPARIN ASSAY: CPT

## 2024-03-20 PROCEDURE — 76937 US GUIDE VASCULAR ACCESS: CPT | Performed by: SURGERY

## 2024-03-20 PROCEDURE — 2709999900 HC NON-CHARGEABLE SUPPLY: Performed by: SURGERY

## 2024-03-20 PROCEDURE — 6360000004 HC RX CONTRAST MEDICATION

## 2024-03-20 PROCEDURE — 93010 ELECTROCARDIOGRAM REPORT: CPT | Performed by: INTERNAL MEDICINE

## 2024-03-20 PROCEDURE — 83036 HEMOGLOBIN GLYCOSYLATED A1C: CPT

## 2024-03-20 PROCEDURE — C1894 INTRO/SHEATH, NON-LASER: HCPCS | Performed by: SURGERY

## 2024-03-20 PROCEDURE — 93005 ELECTROCARDIOGRAM TRACING: CPT | Performed by: STUDENT IN AN ORGANIZED HEALTH CARE EDUCATION/TRAINING PROGRAM

## 2024-03-20 PROCEDURE — 96374 THER/PROPH/DIAG INJ IV PUSH: CPT

## 2024-03-20 PROCEDURE — 6360000004 HC RX CONTRAST MEDICATION: Performed by: STUDENT IN AN ORGANIZED HEALTH CARE EDUCATION/TRAINING PROGRAM

## 2024-03-20 PROCEDURE — 94640 AIRWAY INHALATION TREATMENT: CPT

## 2024-03-20 PROCEDURE — 36415 COLL VENOUS BLD VENIPUNCTURE: CPT

## 2024-03-20 PROCEDURE — 93970 EXTREMITY STUDY: CPT

## 2024-03-20 PROCEDURE — 80053 COMPREHEN METABOLIC PANEL: CPT

## 2024-03-20 PROCEDURE — 2000000000 HC ICU R&B

## 2024-03-20 PROCEDURE — C1757 CATH, THROMBECTOMY/EMBOLECT: HCPCS | Performed by: SURGERY

## 2024-03-20 PROCEDURE — 36014 PLACE CATHETER IN ARTERY: CPT

## 2024-03-20 PROCEDURE — 6360000002 HC RX W HCPCS: Performed by: SURGERY

## 2024-03-20 PROCEDURE — 37184 PRIM ART M-THRMBC 1ST VSL: CPT

## 2024-03-20 PROCEDURE — C1760 CLOSURE DEV, VASC: HCPCS | Performed by: SURGERY

## 2024-03-20 PROCEDURE — 84484 ASSAY OF TROPONIN QUANT: CPT

## 2024-03-20 PROCEDURE — C8929 TTE W OR WO FOL WCON,DOPPLER: HCPCS

## 2024-03-20 PROCEDURE — 75743 ARTERY X-RAYS LUNGS: CPT

## 2024-03-20 PROCEDURE — 6360000002 HC RX W HCPCS

## 2024-03-20 PROCEDURE — 99152 MOD SED SAME PHYS/QHP 5/>YRS: CPT | Performed by: SURGERY

## 2024-03-20 PROCEDURE — 85025 COMPLETE CBC W/AUTO DIFF WBC: CPT

## 2024-03-20 RX ORDER — FENTANYL CITRATE 50 UG/ML
INJECTION, SOLUTION INTRAMUSCULAR; INTRAVENOUS
Status: COMPLETED | OUTPATIENT
Start: 2024-03-20 | End: 2024-03-20

## 2024-03-20 RX ORDER — MIDAZOLAM HYDROCHLORIDE 1 MG/ML
INJECTION INTRAMUSCULAR; INTRAVENOUS
Status: COMPLETED | OUTPATIENT
Start: 2024-03-20 | End: 2024-03-20

## 2024-03-20 RX ORDER — HEPARIN SODIUM 10000 [USP'U]/100ML
1310 INJECTION, SOLUTION INTRAVENOUS CONTINUOUS
Status: DISCONTINUED | OUTPATIENT
Start: 2024-03-20 | End: 2024-03-21

## 2024-03-20 RX ORDER — HEPARIN SODIUM 1000 [USP'U]/ML
40 INJECTION, SOLUTION INTRAVENOUS; SUBCUTANEOUS PRN
Status: DISCONTINUED | OUTPATIENT
Start: 2024-03-20 | End: 2024-03-21 | Stop reason: HOSPADM

## 2024-03-20 RX ORDER — ONDANSETRON 4 MG/1
4 TABLET, ORALLY DISINTEGRATING ORAL EVERY 8 HOURS PRN
Status: DISCONTINUED | OUTPATIENT
Start: 2024-03-20 | End: 2024-03-21 | Stop reason: HOSPADM

## 2024-03-20 RX ORDER — POLYETHYLENE GLYCOL 3350 17 G/17G
17 POWDER, FOR SOLUTION ORAL DAILY PRN
Status: DISCONTINUED | OUTPATIENT
Start: 2024-03-20 | End: 2024-03-21 | Stop reason: HOSPADM

## 2024-03-20 RX ORDER — SODIUM CHLORIDE 0.9 % (FLUSH) 0.9 %
5-40 SYRINGE (ML) INJECTION EVERY 12 HOURS SCHEDULED
Status: DISCONTINUED | OUTPATIENT
Start: 2024-03-20 | End: 2024-03-21 | Stop reason: HOSPADM

## 2024-03-20 RX ORDER — ACETAMINOPHEN 325 MG/1
650 TABLET ORAL EVERY 6 HOURS PRN
Status: DISCONTINUED | OUTPATIENT
Start: 2024-03-20 | End: 2024-03-21 | Stop reason: HOSPADM

## 2024-03-20 RX ORDER — SODIUM CHLORIDE, SODIUM LACTATE, POTASSIUM CHLORIDE, CALCIUM CHLORIDE 600; 310; 30; 20 MG/100ML; MG/100ML; MG/100ML; MG/100ML
INJECTION, SOLUTION INTRAVENOUS CONTINUOUS
Status: DISCONTINUED | OUTPATIENT
Start: 2024-03-20 | End: 2024-03-21

## 2024-03-20 RX ORDER — IPRATROPIUM BROMIDE AND ALBUTEROL SULFATE 2.5; .5 MG/3ML; MG/3ML
1 SOLUTION RESPIRATORY (INHALATION) ONCE
Status: COMPLETED | OUTPATIENT
Start: 2024-03-20 | End: 2024-03-20

## 2024-03-20 RX ORDER — ACETAMINOPHEN 650 MG/1
650 SUPPOSITORY RECTAL EVERY 6 HOURS PRN
Status: DISCONTINUED | OUTPATIENT
Start: 2024-03-20 | End: 2024-03-21 | Stop reason: HOSPADM

## 2024-03-20 RX ORDER — SODIUM CHLORIDE 0.9 % (FLUSH) 0.9 %
5-40 SYRINGE (ML) INJECTION PRN
Status: DISCONTINUED | OUTPATIENT
Start: 2024-03-20 | End: 2024-03-21 | Stop reason: HOSPADM

## 2024-03-20 RX ORDER — POTASSIUM CHLORIDE 7.45 MG/ML
10 INJECTION INTRAVENOUS PRN
Status: DISCONTINUED | OUTPATIENT
Start: 2024-03-20 | End: 2024-03-21 | Stop reason: HOSPADM

## 2024-03-20 RX ORDER — HEPARIN SODIUM 1000 [USP'U]/ML
80 INJECTION, SOLUTION INTRAVENOUS; SUBCUTANEOUS ONCE
Status: COMPLETED | OUTPATIENT
Start: 2024-03-20 | End: 2024-03-20

## 2024-03-20 RX ORDER — POTASSIUM CHLORIDE 20 MEQ/1
40 TABLET, EXTENDED RELEASE ORAL PRN
Status: DISCONTINUED | OUTPATIENT
Start: 2024-03-20 | End: 2024-03-21 | Stop reason: HOSPADM

## 2024-03-20 RX ORDER — HEPARIN SODIUM 1000 [USP'U]/ML
INJECTION, SOLUTION INTRAVENOUS; SUBCUTANEOUS
Status: COMPLETED | OUTPATIENT
Start: 2024-03-20 | End: 2024-03-20

## 2024-03-20 RX ORDER — ALBUTEROL SULFATE 90 UG/1
2 AEROSOL, METERED RESPIRATORY (INHALATION) EVERY 6 HOURS PRN
Status: DISCONTINUED | OUTPATIENT
Start: 2024-03-20 | End: 2024-03-21 | Stop reason: HOSPADM

## 2024-03-20 RX ORDER — HEPARIN SODIUM 1000 [USP'U]/ML
80 INJECTION, SOLUTION INTRAVENOUS; SUBCUTANEOUS PRN
Status: DISCONTINUED | OUTPATIENT
Start: 2024-03-20 | End: 2024-03-21 | Stop reason: HOSPADM

## 2024-03-20 RX ORDER — MAGNESIUM SULFATE IN WATER 40 MG/ML
2000 INJECTION, SOLUTION INTRAVENOUS PRN
Status: DISCONTINUED | OUTPATIENT
Start: 2024-03-20 | End: 2024-03-21 | Stop reason: HOSPADM

## 2024-03-20 RX ORDER — SODIUM CHLORIDE 9 MG/ML
INJECTION, SOLUTION INTRAVENOUS PRN
Status: DISCONTINUED | OUTPATIENT
Start: 2024-03-20 | End: 2024-03-21 | Stop reason: HOSPADM

## 2024-03-20 RX ORDER — ONDANSETRON 2 MG/ML
4 INJECTION INTRAMUSCULAR; INTRAVENOUS EVERY 6 HOURS PRN
Status: DISCONTINUED | OUTPATIENT
Start: 2024-03-20 | End: 2024-03-21 | Stop reason: HOSPADM

## 2024-03-20 RX ADMIN — FENTANYL CITRATE 25 MCG: 50 INJECTION, SOLUTION INTRAMUSCULAR; INTRAVENOUS at 14:39

## 2024-03-20 RX ADMIN — FENTANYL CITRATE 50 MCG: 50 INJECTION, SOLUTION INTRAMUSCULAR; INTRAVENOUS at 15:01

## 2024-03-20 RX ADMIN — HEPARIN SODIUM 1390 UNITS/HR: 10000 INJECTION, SOLUTION INTRAVENOUS at 05:37

## 2024-03-20 RX ADMIN — FENTANYL CITRATE 50 MCG: 50 INJECTION, SOLUTION INTRAMUSCULAR; INTRAVENOUS at 14:29

## 2024-03-20 RX ADMIN — HEPARIN SODIUM 6170 UNITS: 1000 INJECTION INTRAVENOUS; SUBCUTANEOUS at 05:36

## 2024-03-20 RX ADMIN — MUPIROCIN: 20 OINTMENT TOPICAL at 20:55

## 2024-03-20 RX ADMIN — IPRATROPIUM BROMIDE AND ALBUTEROL SULFATE 1 DOSE: 2.5; .5 SOLUTION RESPIRATORY (INHALATION) at 03:10

## 2024-03-20 RX ADMIN — MIDAZOLAM 1 MG: 1 INJECTION INTRAMUSCULAR; INTRAVENOUS at 14:29

## 2024-03-20 RX ADMIN — MIDAZOLAM 1 MG: 1 INJECTION INTRAMUSCULAR; INTRAVENOUS at 15:20

## 2024-03-20 RX ADMIN — MIDAZOLAM 1 MG: 1 INJECTION INTRAMUSCULAR; INTRAVENOUS at 14:39

## 2024-03-20 RX ADMIN — MIDAZOLAM 1 MG: 1 INJECTION INTRAMUSCULAR; INTRAVENOUS at 14:51

## 2024-03-20 RX ADMIN — SODIUM CHLORIDE, POTASSIUM CHLORIDE, SODIUM LACTATE AND CALCIUM CHLORIDE: 600; 310; 30; 20 INJECTION, SOLUTION INTRAVENOUS at 07:30

## 2024-03-20 RX ADMIN — MUPIROCIN: 20 OINTMENT TOPICAL at 09:15

## 2024-03-20 RX ADMIN — FENTANYL CITRATE 50 MCG: 50 INJECTION, SOLUTION INTRAMUSCULAR; INTRAVENOUS at 15:21

## 2024-03-20 RX ADMIN — HEPARIN SODIUM 3000 UNITS: 1000 INJECTION, SOLUTION INTRAVENOUS; SUBCUTANEOUS at 14:38

## 2024-03-20 RX ADMIN — METHYLPREDNISOLONE SODIUM SUCCINATE 125 MG: 125 INJECTION INTRAMUSCULAR; INTRAVENOUS at 02:55

## 2024-03-20 RX ADMIN — IOPAMIDOL 75 ML: 755 INJECTION, SOLUTION INTRAVENOUS at 04:39

## 2024-03-20 RX ADMIN — FENTANYL CITRATE 25 MCG: 50 INJECTION, SOLUTION INTRAMUSCULAR; INTRAVENOUS at 14:51

## 2024-03-20 RX ADMIN — Medication 10 ML: at 20:55

## 2024-03-20 ASSESSMENT — PAIN - FUNCTIONAL ASSESSMENT: PAIN_FUNCTIONAL_ASSESSMENT: NONE - DENIES PAIN

## 2024-03-20 NOTE — PROGRESS NOTES
4 Eyes Skin Assessment     NAME:  Jf Streeter  YOB: 1975  MEDICAL RECORD NUMBER:  [id number]    The patient is being assessed for  Admission    I agree that at least one RN has performed a thorough Head to Toe Skin Assessment on the patient. ALL assessment sites listed below have been assessed.      Areas assessed by both nurses:    Head, Face, Ears, Shoulders, Back, Chest, Arms, Elbows, Hands, Sacrum. Buttock, Coccyx, Ischium, Legs. Feet and Heels, and Under Medical Devices         Does the Patient have a Wound? No noted wound(s)       Travis Prevention initiated by RN: Yes  Wound Care Orders initiated by RN: No wound noted    Pressure Injury (Stage 3,4, Unstageable, DTI, NWPT, and Complex wounds) if present, place Wound referral order by RN under : No wound noted    New Ostomies, if present place, Ostomy referral order under : No ostomy noted    Nurse 1 eSignature: Electronically signed by Wilner La RN on 3/20/24 at 7:25 AM EDT    **SHARE this note so that the co-signing nurse can place an eSignature**    Nurse 2 eSignature: Electronically signed by Ifeoma Carson RN on 3/20/24 at 12:51 PM EDT

## 2024-03-20 NOTE — H&P
V2.0  History and Physical      Name:  Jf Streeter /Age/Sex: 1975  (48 y.o. male)   MRN & CSN:  [id number] & [id number] Encounter Date/Time: 3/20/2024 5:42 AM EDT   Location:   PCP: Jeff Pathak [facility] Day: 1    Assessment and Plan:   Jf Streeter is a 48 y.o. male with a pmh of recently diagnosed with pulmonary embolism 3 months ago was started on Eliquis, stopped taking Eliquis because patient believed it causes hypotension, fentanyl he abuse history chronic GERD hypertension who presents with Acute massive pulmonary embolism (HCC)    Hospital Problems             Last Modified POA    * (Principal) Acute massive pulmonary embolism (HCC) 3/20/2024 Yes       Acute massive PE with right heart strain: Hypotensive start IV fluid.  Start the patient on heparin drip.  Hold off on Eliquis.  Reportedly stopped taking Eliquis as patient believes that it causes hypotension.  Also takes lisinopril and fentanyl IV which could be the trigger for hypertension and not Eliquis explained to the patient in detail.  Echocardiogram to be ordered telemetry in place.  Cardiology and vascular surgery has been consulted.  N.p.o. until cleared by both.  To be admitted to ICU  Fentanyl abuse history recently used.  Patient believes it has never caused hypotension.  Explained about the critical situation.  Asking for help to stop using fentanyl  Benign essential hypertension on lisinopril which is on hold because of blood pressure      Goals status was discussed in detail with patient.  Verbalized understanding of different options of CODE STATUS.  Patient opted for full code.    Comment: Please note this report has been produced using speech recognition software and may contain errors related to that system including errors in grammar, punctuation, and spelling, as well as words and phrases that may be inappropriate. If there are any questions or concerns please feel free to contact the dictating  opacified for evaluation.  There are multiple moderate-sized occlusive and partially occlusive intraluminal filling defects within the bilateral main, segmental and subsegmental vessels supplying all lobes.  The main pulmonary artery is normal in caliber.  There is evidence of right ventricular strain with an RV/LV ratio of 1.8. Mediastinum: No evidence of mediastinal lymphadenopathy.  The heart and pericardium demonstrate no acute abnormality.  There is no acute abnormality of the thoracic aorta. Lungs/pleura: There is a 2.8 cm lobular soft tissue mass within the anterior aspect of the left lower lobe.  There are several central dystrophic calcifications present.  There is a thick walled cavitary 11 mm nodule within the medial segment of the right middle lobe as well as noncalcified 6 mm nodules within the lung apices.  A few additional scattered 2-3 mm nodules are present.  These nodules are not significantly changed when compared to the previous study.  There is no acute lobar consolidation, pneumothorax or effusion. Upper Abdomen: Limited images of the upper abdomen are unremarkable. Soft Tissues/Bones: No acute bone or soft tissue abnormality.     1. Extensive bilateral pulmonary artery emboli with evidence of right ventricular strain. 2. Stable bilateral pulmonary nodules. The largest is a lobular soft tissue mass within the left lower lobe measuring 2.8 cm. This is unchanged from the previous exam. This may be related to a hamartoma. 3. Multiple pulmonary nodules. Most severe: 11 mm right solid pulmonary nodule. 4. Nodule size greater than 8 mm.       Personally reviewed Lab Studies, Imaging    Electronically signed by Stacey Mota MD on 3/20/2024 at 5:42 AM

## 2024-03-20 NOTE — ED NOTES
Mr. Streeter is a 48 y.o. male who had concerns including Shortness of Breath (States has been having off and on shortness of breathe for a couple days. Went to [facility] earlier today left AMA. ).    Chief Complaint   Patient presents with    Shortness of Breath     States has been having off and on shortness of breathe for a couple days. Went to [facility] earlier today left AMA.        He is being admitted for:    Acute massive pulmonary embolism (HCC)    His ED problem list included:    1. Bilateral pulmonary embolism (HCC)    2. Noncompliance with medication regimen    3. 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    Histoplasmosis     Injury of nerve of wrist or hand 01/01/2009    saw injury left hand       Past Surgical History:   Procedure Laterality Date    PLEURAL BIOPSY Right 2005    SHOULDER SURGERY Left     for dislocation       His recent abnormal labs were:    Labs Reviewed   BLOOD GAS, VENOUS - Abnormal; Notable for the following components:       Result Value    Carboxyhemoglobin 3.5 (*)     All other components within normal limits   CBC WITH AUTO DIFFERENTIAL - Abnormal; Notable for the following components:    WBC 13.1 (*)     Neutrophils Absolute 8.2 (*)     All other components within normal limits   TROPONIN - Abnormal; Notable for the following components:    Troponin, High Sensitivity 80 (*)     All other components within normal limits   COMPREHENSIVE METABOLIC PANEL W/ REFLEX TO MG FOR LOW K - Abnormal; Notable for the following components:    Glucose 117 (*)     Albumin/Globulin Ratio 1.0 (*)     All other components within normal limits   SPECIMEN REJECTION   APTT   APTT   ANTI-XA, UNFRACTIONATED HEPARIN   APTT       His vital signs for the encounter were:    Patient Vitals for the past 24 hrs:   BP Temp Temp src Pulse Resp SpO2 Height Weight   03/20/24 0608 -- -- -- 98 13 97 % --

## 2024-03-20 NOTE — PROGRESS NOTES
Pt back from procedure. VSS. It awake and alert. Right groin site is dry and intact with no swelling or hematoma. Right pedal pulse noted. Can sit up at 1730. Heparin gtt to remain pharmacy dosed.

## 2024-03-20 NOTE — CONSULTS
Pharmacy to Manage Heparin Infusion per Hospital Nomogram    Dx: PE  Pt wt = 77.1 kg  Baseline aPTT =     Oral factor Xa-inhibitors may alter and elevate anti-Xa levels used for unfractionated heparin monitoring. As a result, anti-Xa monitoring is not accurate while Xa-inhibitor activity is detectable. Utilize aPTT monitoring when patient received an oral factor Xa-inhibitor (apixaban, betrixaban, edoxaban or rivaroxaban) within 72 hours prior to admission (please document last administration time). The goal is to allow a washout of oral factor Xa-inhibitors by using aPTT for 72 hours, then change to ant-Xa levels for UFH.     Heparin (weight-based) Infusion: VTE/DVT/PE  Heparin 80 units/kg IVP bolus (max 10,000 units) followed by Heparin infusion at 18 units/kg/hr (recommended max initial rate: 2100 units/hr).  Recheck anti-Xa (unless aPTT being used) in 6 hours.  Goal anti-Xa 0.3-0.7 IU/mL  Goal aPTT =  seconds.    Pt should be on Eliquis but unsure when/If last dose was taken.    Pepe Samuel, Pharm D.3/20/2024 5:33 AM    3/20  - Anti-Xa = 0.69 at 12:09  - No changes needed  - Continue infusion at current rate of 1390 units/hr per protocol  - Next Anti-Xa at 18:00.  Payton Tafoya/[facility]. 3/20/24 1:10 PM EDT    3/20/24 2009  Anti-Xa 0.77 at 1931  Decrease Heparin infusion to 1310 units/hr  Recheck anti-Xa in 6 hours.  Carlyle Contreras, PharmD 3/20/2024 8:10 PM    3/21/24 at 0159  Anti-Xa: 0.47  Plan: Continue current heparin infusion rate of 1310 units/hr, recheck anti-Xa at 0800  Donovan Lou PharmD 3/21/2024  2:33 AM    3/21 0851  Anti-Xa - 0.37  Continue heparin infusion at 1310 units/hr  Next anti-Xa 3/22  Remington Ward, PharmD 3/21/2024 9:34 AM

## 2024-03-20 NOTE — PROGRESS NOTES
Vascular    Recurrent PE after stopping anticoagulation.   Will await Echo to determine if needs PA thrombectomy.

## 2024-03-20 NOTE — CONSULTS
Vascular Surgery Consultation    Date of Admission:  3/20/2024  2:25 AM  Date of Consultation:  3/20/2024    PCP:  Jeff Pathak       Chief Complaint: Shortness of Breath    History of Present Illness:   We are asked to see this patient in consultation by Dr. Mary regarding pulmonary emboli.   Jf Streeter is a 48 y.o. male who has a prior history of DVT PE.  He was treated Nov last year with PA lysis.  He was discharged on Eliquis but reports stopped taking it as it made him feel dizzy.  He has had current symptoms for past several weeks but now worsened.    He denies leg pain or swelling.       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 Authorizing Provider   apixaban (ELIQUIS) 5 MG TABS tablet Take 1 tablet by mouth 2 times daily 1/24/24   Thompson Sherman MD   valsartan (DIOVAN) 160 MG tablet Take 1 tablet by mouth daily 1/24/24   Thompson Sherman MD   famotidine (PEPCID) 20 MG tablet Take 1 tablet by mouth 2 times daily 11/16/23   Kiesha Em MD   albuterol (PROVENTIL HFA;VENTOLIN HFA) 108 (90 BASE) MCG/ACT inhaler Inhale 2 puffs into the lungs every 6 hours as needed.    ProviderZenaida MD        Facility Administered Medications:    sodium chloride flush  5-40 mL IntraVENous 2 times per day       Allergies:  Codeine, Hydrocodone, and Hydrocodone-acetaminophen     Social History:      Social History     Socioeconomic History    Marital status:      Spouse name: Not on file    Number of children: 2     Years of education: Not on file    Highest education level: Not on file   Occupational History    Occupation: unemployed    Tobacco Use    Smoking status: Every Day     Current packs/day: 0.50     Types: Cigarettes    Smokeless tobacco: Never    Tobacco comments:     encouraged to consider quitting.   Vaping Use    Vaping Use: Never used   Substance and Sexual Activity    Alcohol use: No    Drug use: Yes     Types: Other-see comments     Comment: Fentanyl    Sexual activity: Yes     Partners: Female     Comment: lives with GF olamide   Other Topics Concern    Not on file   Social History Narrative    Not on file     Social Determinants of Health     Financial Resource Strain: Not on file   Food Insecurity: Not on file (11/16/2023)   Transportation Needs: No Transportation Needs (11/17/2023)    Transportation Problems ([facility] HRSN)     In the past 12 months, has lack of reliable transportation kept you from medical appointments, meetings, work or from getting things needed for daily living?: Not on file   Physical Activity: Not on file   Stress: Not on file   Social Connections: Not on file   Intimate Partner Violence: Not on file   Housing Stability: Low Risk  (11/16/2023)    Housing Stability Vital Sign     Unable to Pay for Housing in the Last Year: No     Number of Places Lived in the Last Year: 1     Unstable Housing in the Last Year: No       Family History:        Adopted: Yes       Review of Systems:  A 14 point review of systems was completed. Pertinent positives identified in the HPI, all other review of systems negative.      Physical Examination:    /68   Pulse 97   Temp 97.5 °F (36.4 °C) (Oral)   Resp 14   Ht 1.753 m (5' 9\")   Wt 86.7 kg (191 lb 2.2 oz)   SpO2 95%   BMI 28.23 kg/m²        Admission Weight - Scale: 77.1 kg (170 lb)       General appearance: NAD  Eyes: PERRLA  Neck: no JVD, no lymphadenopathy.  Respiratory: effort is unlabored, no crackles, wheezes or rubs.  Cardiovascular:  Results   Component Value Date/Time    COLORU DARK YELLOW 11/14/2023 02:08 PM    PHUR 6.0 11/14/2023 02:08 PM    PHUR 6.0 11/14/2023 02:08 PM    CLARITYU Clear 11/14/2023 02:08 PM    SPECGRAV 1.010 11/14/2023 02:08 PM    LEUKOCYTESUR Negative 11/14/2023 02:08 PM    UROBILINOGEN 0.2 11/14/2023 02:08 PM    BILIRUBINUR Negative 11/14/2023 02:08 PM    BLOODU Negative 11/14/2023 02:08 PM    GLUCOSEU Negative 11/14/2023 02:08 PM         Assessment:  Bilateral PE with RV strain/cor pulmonale.     Plan: Pulmonary artery thrombectomy.   Procedure, risks, benefits, and alternatives explained and understood.   He will need lifelong anticoagulation, perhaps can try Xarelto this time.

## 2024-03-20 NOTE — PROGRESS NOTES
Vascular    PA Angiogram and Thrombectomy.   Some acute clot removed.  Significant chronic adherent thrombus RLL.

## 2024-03-20 NOTE — ED PROVIDER NOTES
[facility]  ED     EMERGENCY DEPARTMENT ENCOUNTER            Pt Name: Jf Streeter   MRN: [id number]   Birthdate 1975   Date of evaluation: 3/20/2024   Provider: Ramona Mary MD   PCP: Jeff Pathak   Note Started: 2:27 AM EDT 3/20/24          CHIEF COMPLAINT     Chief Complaint   Patient presents with    Shortness of Breath     States has been having off and on shortness of breathe for a couple days. Went to [place] earlier today left AMA.         HISTORY OF PRESENT ILLNESS:   History from : Patient and Family      Limitations to history : None     Jf Streeter is a 48 y.o. male who presents complaining of shortness of breath.  Patient has been having shortness of breath on and off for the past few weeks.  Has had a nonproductive cough as well.  Per family member at bedside, patient does not normally wear oxygen at home.  Does have previous history of pulmonary emboli for which he is supposed to be on Eliquis but has not been taking it over the past several months.  They deny fevers.  He denies leg pain or leg swelling.  Has used home inhalers with some relief.  Has not been on any antibiotics or steroids recently.  Denies chest pain.  Denies any other complaints or concerns    Nursing Notes were all reviewed and agreed with, or any disagreements were addressed in the HPI.     REVIEW OF SYSTEMS :    Positives and Pertinent negatives as per HPI.      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    Past Surgical History:   Procedure Laterality Date    PLEURAL BIOPSY Right 2005    SHOULDER SURGERY Left     for dislocation      CURRENTMEDICATIONS       Previous Medications    ALBUTEROL (PROVENTIL HFA;VENTOLIN HFA) 108 (90 BASE) MCG/ACT INHALER    Inhale 2 puffs into the lungs every 6 hours as needed.    APIXABAN (ELIQUIS) 5 MG TABS  TABLET    Take 1 tablet by mouth 2 times daily    FAMOTIDINE (PEPCID) 20 MG TABLET    Take 1 tablet by mouth 2 times daily    VALSARTAN (DIOVAN) 160 MG TABLET    Take 1 tablet by mouth daily      SCREENINGS          Christophe Coma Scale  Eye Opening: Spontaneous  Best Verbal Response: Oriented  Best Motor Response: Obeys commands  [place] Coma Scale Score: 15                CIWA Assessment  BP: 96/78  Pulse: 93                  PHYSICAL EXAM :  ED Triage Vitals   BP Temp Temp src Pulse Resp SpO2 Height Weight   -- -- -- -- -- -- -- --      GENERAL APPEARANCE: Awake and alert. Cooperative. No acute distress.  HEAD: Normocephalic. Atraumatic.  EYES: PERRL. EOM's grossly intact.   ENT: Mucous membranes are moist.   NECK: Supple, trachea midline.   HEART: RRR. Normal S1, S2. No murmurs, rubs or gallops.   LUNGS: Respirations unlabored.  Moderate expiratory wheezing bilaterally throughout lung fields.  Speaking comfortably in full sentences.   ABDOMEN: Soft. Non-distended. Non-tender. No guarding or rebound.   EXTREMITIES: No peripheral edema. No acute deformities.  SKIN: Warm and dry. No acute rashes.   NEUROLOGICAL: Alert and oriented    DIAGNOSTIC RESULTS     LABS:   Labs Reviewed   BLOOD GAS, VENOUS - Abnormal; Notable for the following components:       Result Value    Carboxyhemoglobin 3.5 (*)     All other components within normal limits   CBC WITH AUTO DIFFERENTIAL - Abnormal; Notable for the following components:    WBC 13.1 (*)     Neutrophils Absolute 8.2 (*)     All other components within normal limits   TROPONIN - Abnormal; Notable for the following components:    Troponin, High Sensitivity 80 (*)     All other components within normal limits   COMPREHENSIVE METABOLIC PANEL W/ REFLEX TO MG FOR LOW K - Abnormal; Notable for the following components:    Glucose 117 (*)     Albumin/Globulin Ratio 1.0 (*)     All other components within normal limits   SPECIMEN REJECTION   APTT   APTT   ANTI-XA, UNFRACTIONATED  HEPARIN   APTT      When ordered only abnormal lab results are displayed. All other labs were within normal range or not returned as of this dictation.     RADIOLOGY:      Non-plain film images such as CT, Ultrasound and MRI are read by the radiologist. Plain radiographic images are visualized and preliminarily interpreted by the ED Provider with the below findings:   Interpretation per the Radiologist below, if available at the time of this note:  CT CHEST PULMONARY EMBOLISM W CONTRAST   Final Result   1. Extensive bilateral pulmonary artery emboli with evidence of right   ventricular strain.   2. Stable bilateral pulmonary nodules. The largest is a lobular soft tissue   mass within the left lower lobe measuring 2.8 cm. This is unchanged from the   previous exam. This may be related to a hamartoma.   3. Multiple pulmonary nodules. Most severe: 11 mm right solid pulmonary   nodule.   4. Nodule size greater than 8 mm.            EKG: The Ekg interpreted by me shows  normal sinus rhythm with a rate of 90  Axis is   Normal  QTc is  normal  Intervals and Durations are unremarkable.      ST Segments: nonspecific changes  Normal sinus rhythm has replaced sinus tachycardia, nonspecific T wave abnormalities in multiple leads are similar compared to previous performed 11/14/2023    PROCEDURES   Unless otherwise noted below, none     CRITICAL CARE TIME   I personally saw the patient and independently provided 55 minutes of non-concurrent critical care out of the total shared critical care time excluding separately billable procedures.        Vitals:    Vitals:    03/20/24 0419 03/20/24 0424 03/20/24 0426 03/20/24 0457   BP: (!) 101/59   96/78   Pulse: 93 97 97 93   Resp: 11 12 13 12   Temp:       TempSrc:       SpO2: 92% 97% 93% 92%   Weight:       Height:                 Is this patient to be included in the SEP-1 Core Measure due to severe sepsis or septic shock?   No   Exclusion criteria - the patient is NOT to be included

## 2024-03-20 NOTE — RT PROTOCOL NOTE
RT Nebulizer Bronchodilator Protocol Note    There is a bronchodilator order in the chart from a provider indicating to follow the RT Bronchodilator Protocol and there is an “Initiate RT Bronchodilator Protocol” order as well (see protocol at bottom of note).    CXR Findings:  No results found.    The findings from the last RT Protocol Assessment were as follows:  Smoking: None or smoker <15 pack years  Respiratory Pattern: Regular pattern and RR 12-20 bpm  Breath Sounds: Clear breath sounds  Cough: Strong, spontaneous, non-productive  Indication for Bronchodilator Therapy: On home bronchodilators  Bronchodilator Assessment Score: 0    Aerosolized bronchodilator medication orders have been revised according to the RT Nebulizer Bronchodilator Protocol below.    Respiratory Therapist to perform RT Therapy Protocol Assessment initially then follow the protocol.  Repeat RT Therapy Protocol Assessment PRN for score 0-3 or on second treatment, BID, and PRN for scores above 3.    No Indications - adjust the frequency to every 6 hours PRN wheezing or bronchospasm, if no treatments needed after 48 hours then discontinue using Per Protocol order mode.     If indication present, adjust the RT bronchodilator orders based on the Bronchodilator Assessment Score as indicated below.  If a patient is on this medication at home then do not decrease Frequency below that used at home.    0-3 - enter or revise RT bronchodilator order(s) to equivalent RT Bronchodilator order with Frequency of every 4 hours PRN for wheezing or increased work of breathing using Per Protocol order mode.       4-6 - enter or revise RT Bronchodilator order(s) to two equivalent RT bronchodilator orders with one order with BID Frequency and one order with Frequency of every 4 hours PRN wheezing or increased work of breathing using Per Protocol order mode.         7-10 - enter or revise RT Bronchodilator order(s) to two equivalent RT bronchodilator orders with

## 2024-03-20 NOTE — CARE COORDINATION
Attempt to do CM assess- pt is in Cath lab for thrombectomy.  Will follow up tomorrow.  Rebeka Felipe RN

## 2024-03-20 NOTE — CONSULTS
Placed call to Vascular Surgery @ 2691  RE: PEs with right heart strain per MD Ashvin Mary MD called back @ [phone]

## 2024-03-21 VITALS
WEIGHT: 201.72 LBS | OXYGEN SATURATION: 89 % | BODY MASS INDEX: 29.88 KG/M2 | HEART RATE: 62 BPM | RESPIRATION RATE: 13 BRPM | HEIGHT: 69 IN | TEMPERATURE: 98.2 F | SYSTOLIC BLOOD PRESSURE: 122 MMHG | DIASTOLIC BLOOD PRESSURE: 76 MMHG

## 2024-03-21 LAB
ANION GAP SERPL CALCULATED.3IONS-SCNC: 10 MMOL/L (ref 3–16)
ANTI-XA UNFRAC HEPARIN: 0.37 IU/ML (ref 0.3–0.7)
ANTI-XA UNFRAC HEPARIN: 0.47 IU/ML (ref 0.3–0.7)
BUN SERPL-MCNC: 18 MG/DL (ref 7–20)
CALCIUM SERPL-MCNC: 8.4 MG/DL (ref 8.3–10.6)
CHLORIDE SERPL-SCNC: 103 MMOL/L (ref 99–110)
CO2 SERPL-SCNC: 24 MMOL/L (ref 21–32)
CREAT SERPL-MCNC: 1 MG/DL (ref 0.9–1.3)
DEPRECATED RDW RBC AUTO: 14.3 % (ref 12.4–15.4)
GFR SERPLBLD CREATININE-BSD FMLA CKD-EPI: >60 ML/MIN/{1.73_M2}
GLUCOSE SERPL-MCNC: 104 MG/DL (ref 70–99)
HCT VFR BLD AUTO: 34.6 % (ref 40.5–52.5)
HGB BLD-MCNC: 11.6 G/DL (ref 13.5–17.5)
MAGNESIUM SERPL-MCNC: 1.5 MG/DL (ref 1.8–2.4)
MCH RBC QN AUTO: 29.5 PG (ref 26–34)
MCHC RBC AUTO-ENTMCNC: 33.6 G/DL (ref 31–36)
MCV RBC AUTO: 88 FL (ref 80–100)
PHOSPHATE SERPL-MCNC: 2.9 MG/DL (ref 2.5–4.9)
PLATELET # BLD AUTO: 185 K/UL (ref 135–450)
PMV BLD AUTO: 7.3 FL (ref 5–10.5)
POTASSIUM SERPL-SCNC: 4.2 MMOL/L (ref 3.5–5.1)
RBC # BLD AUTO: 3.93 M/UL (ref 4.2–5.9)
SODIUM SERPL-SCNC: 137 MMOL/L (ref 136–145)
TROPONIN, HIGH SENSITIVITY: 24 NG/L (ref 0–22)
TROPONIN, HIGH SENSITIVITY: 25 NG/L (ref 0–22)
TROPONIN, HIGH SENSITIVITY: 28 NG/L (ref 0–22)
WBC # BLD AUTO: 15.4 K/UL (ref 4–11)

## 2024-03-21 PROCEDURE — 2580000003 HC RX 258: Performed by: STUDENT IN AN ORGANIZED HEALTH CARE EDUCATION/TRAINING PROGRAM

## 2024-03-21 PROCEDURE — 02CQ3ZZ EXTIRPATION OF MATTER FROM RIGHT PULMONARY ARTERY, PERCUTANEOUS APPROACH: ICD-10-PCS | Performed by: SURGERY

## 2024-03-21 PROCEDURE — 6360000002 HC RX W HCPCS: Performed by: STUDENT IN AN ORGANIZED HEALTH CARE EDUCATION/TRAINING PROGRAM

## 2024-03-21 PROCEDURE — 6370000000 HC RX 637 (ALT 250 FOR IP): Performed by: INTERNAL MEDICINE

## 2024-03-21 PROCEDURE — 6360000002 HC RX W HCPCS: Performed by: INTERNAL MEDICINE

## 2024-03-21 PROCEDURE — 85520 HEPARIN ASSAY: CPT

## 2024-03-21 PROCEDURE — 83735 ASSAY OF MAGNESIUM: CPT

## 2024-03-21 PROCEDURE — 36415 COLL VENOUS BLD VENIPUNCTURE: CPT

## 2024-03-21 PROCEDURE — 02CR3ZZ EXTIRPATION OF MATTER FROM LEFT PULMONARY ARTERY, PERCUTANEOUS APPROACH: ICD-10-PCS | Performed by: SURGERY

## 2024-03-21 PROCEDURE — 84100 ASSAY OF PHOSPHORUS: CPT

## 2024-03-21 PROCEDURE — 84484 ASSAY OF TROPONIN QUANT: CPT

## 2024-03-21 PROCEDURE — 80048 BASIC METABOLIC PNL TOTAL CA: CPT

## 2024-03-21 PROCEDURE — 85027 COMPLETE CBC AUTOMATED: CPT

## 2024-03-21 RX ADMIN — HEPARIN SODIUM 1310 UNITS/HR: 10000 INJECTION, SOLUTION INTRAVENOUS at 00:18

## 2024-03-21 RX ADMIN — RIVAROXABAN 15 MG: 15 TABLET, FILM COATED ORAL at 10:43

## 2024-03-21 RX ADMIN — MAGNESIUM SULFATE HEPTAHYDRATE 2000 MG: 40 INJECTION, SOLUTION INTRAVENOUS at 05:13

## 2024-03-21 RX ADMIN — SODIUM CHLORIDE, POTASSIUM CHLORIDE, SODIUM LACTATE AND CALCIUM CHLORIDE: 600; 310; 30; 20 INJECTION, SOLUTION INTRAVENOUS at 03:22

## 2024-03-21 RX ADMIN — MUPIROCIN: 20 OINTMENT TOPICAL at 07:41

## 2024-03-21 NOTE — PROGRESS NOTES
Vascular Surgery Progress Note      SUBJECTIVE:  No c/o this am  Off Oxygen    OBJECTIVE    Physical  CURRENT VITALS:  /83   Pulse 63   Temp 98.8 °F (37.1 °C) (Bladder)   Resp 14   Ht 1.753 m (5' 9\")   Wt 91.5 kg (201 lb 11.5 oz)   SpO2 96%   BMI 29.79 kg/m²   24 HR INTAKE/OUTPUT:    Intake/Output Summary (Last 24 hours) at 3/21/2024 0729  Last data filed at 3/21/2024 0600  Gross per 24 hour   Intake 1195.54 ml   Output 900 ml   Net 295.54 ml     Groin access site without hematoma    Data  CBC:   Lab Results   Component Value Date/Time    WBC 15.4 03/21/2024 04:29 AM    RBC 3.93 03/21/2024 04:29 AM    HGB 11.6 03/21/2024 04:29 AM    HCT 34.6 03/21/2024 04:29 AM    MCV 88.0 03/21/2024 04:29 AM    MCH 29.5 03/21/2024 04:29 AM    MCHC 33.6 03/21/2024 04:29 AM    RDW 14.3 03/21/2024 04:29 AM     03/21/2024 04:29 AM    MPV 7.3 03/21/2024 04:29 AM         ASSESSMENT AND PLAN    S/P PA Thrombectomy   Off Oxygen    No tachycardia   Normal BP  Ok to transition to oral anticoagulation- reports intolerant of Eliquis thus recommend Xarelto  Discharge per Medicine- f/u with me prn

## 2024-03-21 NOTE — CARE COORDINATION
Case Management Assessment  Initial Evaluation    Date/Time of Evaluation: 3/21/2024 10:48 AM  Assessment Completed by: Jackie Farris RN    If patient is discharged prior to next notation, then this note serves as note for discharge by case management.    Patient Name: Jf Streeter                   YOB: 1975  Diagnosis: Noncompliance with medication regimen [Z91.148]  Drug use [F19.90]  Bilateral pulmonary embolism (HCC) [I26.99]  Acute massive pulmonary embolism (HCC) [I26.99]                   Date / Time: 3/20/2024  2:25 AM    Patient Admission Status: Inpatient   Readmission Risk (Low < 19, Mod (19-27), High > 27): Readmission Risk Score: 10.9    Current PCP: Jeff Pathak  PCP verified by CM? Yes    Chart Reviewed: Yes      History Provided by: Patient  Patient Orientation: Alert and Oriented, Person, Place, Situation, Self (sleepy but arousable)    Patient Cognition:      Hospitalization in the last 30 days (Readmission):  No    If yes, Readmission Assessment in CM Navigator will be completed.    Advance Directives:      Code Status: Full Code   Patient's Primary Decision Maker is: Legal Next of Kin    Primary Decision Maker: Suzette Michelle - Other - [phone]    Secondary Decision Maker: Bruce Streeterothy - Brother/Sister - [phone]    Discharge Planning:    Patient lives with: Spouse/Significant Other Type of Home: Apartment  Primary Care Giver: Self  Patient Support Systems include: Spouse/Significant Other, Children, Family Members   Current Financial resources: Medicaid  Current community resources: None  Current services prior to admission: None            Current DME:              Type of Home Care services:  None    ADLS  Prior functional level: Independent in ADLs/IADLs  Current functional level: Independent in ADLs/IADLs    PT AM-PAC:   /24  OT AM-PAC:   /24    Family can provide assistance at DC: Yes  Would you like Case Management to discuss the discharge plan  with any other family members/significant others, and if so, who? No  Plans to Return to Present Housing: Yes  Other Identified Issues/Barriers to RETURNING to current housing: no  Potential Assistance needed at discharge: Prescription Assistance            Potential DME:    Patient expects to discharge to: Apartment  Plan for transportation at discharge:      Financial    Payor: AMBETTER / Plan: AMBETTER / Product Type: *No Product type* /     Does insurance require precert for SNF: Yes    Potential assistance Purchasing Medications: Yes  Meds-to-Beds request: Yes      [id number] - [place], [place] - [address] - P [phone] - F [phone]  [address] [facility] [facility] [address]  Phone: [phone] Fax: [phone]    [facility] - [facility] [address] [facility] - P [phone] - F [phone]  [address] [facility] [address]  Phone: [phone] Fax: [phone]      Notes:    Factors facilitating achievement of predicted outcomes: Family support and Cooperative    Barriers to discharge: Impulsivity, Limited insight into deficits, Medical complications, and Medication managment    Additional Case Management Notes: SPoke with the pt at bedside. Pt lives with he says his wife of 18 years but not legally . \"[place]\"- He has 2 children and she has 1.   IPTA-  Pt has dirty skin and hands black under his nails. Cooperative. Treated for PE , has a HX of Fentanyl abuse. Stopped taking his anticoagulant said it made him feel dizzy. New Rx for Xarelto. Writer called outpt pharmacy and spoke to Teddy about pricing with the payers insurance. $538.60/month with his insurance. Then Teddy ran with a co-pay card, $10/month. Will follow for DC needs besides meds.     The Plan for Transition of Care is related to the following treatment goals of Noncompliance with medication regimen [Z91.148]  Drug use [F19.90]  Bilateral pulmonary embolism (HCC) [I26.99]  Acute massive

## 2024-03-21 NOTE — ACP (ADVANCE CARE PLANNING)
Advance Care Planning     General Advance Care Planning (ACP) Conversation    Date of Conversation: 3/21/2024  Conducted with: Patient with Decision Making Capacity    Healthcare Decision Maker:    Primary Decision Maker: Suzette Michelle - Other - [phone]    Secondary Decision Maker: Nelson Streeter - Brother/Sister - [phone]  Click here to complete Healthcare Decision Makers including selection of the Healthcare Decision Maker Relationship (ie \"Primary\").   Today pt stated he wants his SO to make decisions with his children. \"They know what to do .\"  ANd his brother Nelson Streeter second.    Content/Action Overview:  DECLINED ACP Conversation - will revisit periodically  Reviewed DNR/DNI and patient elects Full Code (Attempt Resuscitation)        Length of Voluntary ACP Conversation in minutes:  <16 minutes (Non-Billable)    Jackie Farris RN

## 2024-03-21 NOTE — PROGRESS NOTES
Hospital Medicine Progress Note      Date of Admission: 3/20/2024  Hospital Day: 2    Chief Admission Complaint:  ***     Subjective:  ***    Telemetry (reviewed by me):  ***    Presenting Admission History:       Patient states that 3 months ago patient was diagnosed with pulmonary embolism and was started on Eliquis. Patient took it for couple of days but realized that every time he takes his medications including lisinopril and Eliquis his blood pressure starts dropping really low. The patient believes that it is the Eliquis that causes the blood pressure to go down and that is why he stopped taking Eliquis. It has been around 2 months his blood pressure has been going up and down to the point that he goes very soft around 70 x 40. Explained the patient in detail about the critical situation and the migration of the clot as a concern. CT scan showed extensive clot burden with right heart strain now. Does have chest pain shortness of breath cough fatigue tachycardia hypotension. Denies any nausea vomiting diarrhea constipation pain. To be admitted to the hospital for acute PE with right heart strain cardiology will be consulted. Along with vascular surgery.     Assessment/Plan:      Current Principal Problem:  Acute massive pulmonary embolism (HCC)    Acute massive PE with right heart strain: Hypotensive start IV fluid.  Start the patient on heparin drip.  Hold off on Eliquis.  Reportedly stopped taking Eliquis as patient believes that it causes hypotension.  Also takes lisinopril and fentanyl IV which could be the trigger for hypertension and not Eliquis explained to the patient in detail.  Echocardiogram to be ordered telemetry in place.  Cardiology and vascular surgery has been consulted.  N.p.o. until cleared by both.  To be admitted to ICU  Fentanyl abuse history recently used.  Patient believes it has never caused hypotension.  Explained about the critical situation.  Asking for help to stop using

## 2024-03-21 NOTE — PROGRESS NOTES
Shift: [id number]    Admitting diagnosis: Acute Massive PE    Presentation to hospital: SOB    Surgery: yes -      Nursing assessment at handoff  stable    Emergency Contact/POA:  Girlfriend, Brother  Family updated: yes -     Most recent vitals: /80   Pulse 66   Temp 98.8 °F (37.1 °C) (Bladder)   Resp 13   Ht 1.753 m (5' 9\")   Wt 91.5 kg (201 lb 11.5 oz)   SpO2 96%   BMI 29.79 kg/m²      Rhythm: Normal Sinus Rhythm      NC/HFNC- Room air lpm  Respiratory support: - No ventilator support    Vent days: Day 0- NA    Increased O2 requirements: no    Admission weight Weight - Scale: 77.1 kg (170 lb)  Today's weight   Wt Readings from Last 1 Encounters:   03/21/24 91.5 kg (201 lb 11.5 oz)         UOP >30ml/hr: no     Skinner need assessed each shift: no skinner cath    Restraints: no  Order current and documentation up to date?    Lines/Drains  LDA Insertion Date Discontinued Date Dressing Changes   PIV  3/20/24     TLC       Arterial       Skinner       Vas Cath      ETT       Surgical drains        Night Shift Hospitalist Interventions    Problem(Brief) Date Time Intervention Physician contacted                                               Drip rates at handoff:    heparin (PORCINE) Infusion 1,310 Units/hr (03/21/24 0600)    sodium chloride      lactated ringers IV soln Stopped (03/21/24 0513)       Hospital Course Daily Updates:  Admit Day# 0- 3/20/24- Night  - S/P Pulmonary artery thrombectomy in am  - hemodynamically stable  - stays on room air over the night without any respiratory distress  - site is clean, dry and intact, no hematoma, no bleeding, no tenderness. Mild minimal soaked with the dressing coming the OR  - UOP for 12 hours is only 300mls  - Heparin decreased to 1,310units as Anti- Xa was 0.77; latest one is therapeutic at 0.47  - Mg replaced; still on LR drip      Lab Data:   CBC:   Recent Labs     03/20/24  0340 03/21/24  0429   WBC 13.1* 15.4*   HGB 13.8 11.6*   HCT 41.7 34.6*   MCV 88.2 88.0

## 2024-03-21 NOTE — CARE COORDINATION
CASE MANAGEMENT DISCHARGE SUMMARY      Discharge to: Home with S.O.     Pt has Rx for Xarelto ready for  outpt pharmacy.   One month Brisa/ $10 for 90 day supply    Transportation:    Family/car: yes   Confirmed discharge plan with:     Patient: yes     Family:  yes   Name: Contact number:     Facility/Agency, name:  [facility] faxed   Phone number for report to facility:      RN, name: Xin    Note: Discharging nurse to complete [facility], reconcile [facility], and place final copy with patient's discharge packet. RN to ensure that written prescriptions for  Level II medications are sent with patient to the facility as per protocol.    Jackie Farris RN

## 2024-03-21 NOTE — OP NOTE
[address] [address]                            OPERATIVE REPORT      PATIENT NAME: NARCISO ABBOTT               : 1975  MED REC NO: [id number]                      ROOM: 0236  ACCOUNT NO: [id number]                       ADMIT DATE: 2024  PROVIDER: Dar Lock MD      DATE OF PROCEDURE:  2024    SURGEON:  Dar Lock MD    PREOPERATIVE DIAGNOSIS:  Pulmonary emboli with cor pulmonale.    POSTOPERATIVE DIAGNOSIS:  Pulmonary emboli with cor pulmonale.    PROCEDURES:    1. Ultrasound-guided right femoral venous access.  2. Placement of catheters in the right and left pulmonary arteries.  3. Pulmonary artery angiograms.  4. Mechanical thrombectomy of the right and left pulmonary arteries.    ANESTHESIA:  Local with moderate monitored sedation.    INDICATIONS:  The patient is a 48-year-old male with a prior history of DVT and PE.  He was on anticoagulation, but he stopped this several months ago.  He has now developed recurrent pulmonary embolus with evidence of cor pulmonale.  The patient is brought to the angio suite at this time to undergo angiography and mechanical thrombectomy.    DESCRIPTION OF PROCEDURE:  The patient is brought to the angio suite, placed in supine position.  Under my direct supervision, Versed and fentanyl were administered for moderate sedation.  The patient was monitored by an independent trained nurse observer using continuous blood pressure, EKG, and pulse oximetry.  I spent approximately 57 minutes face-to-face with the patient providing and directing sedation.  The right groin was then prepped and draped in sterile fashion.  Ultrasound was used to identify the common femoral vein which was patent, compressible, and free of thrombus.  Under direct ultrasound visualization, this was accessed and a 6-Lithuanian introducer sheath was placed. A copy of the ultrasound image was saved in the  the inferior vena cava.  The sheath was removed and a 6-Kenyan Perclose device was placed with a suture set aside for closure at the conclusion of the procedure.  Prior to removing the Perclose device, an Amplatz wire was passed through this into the inferior vena cava.  Sequential dilators were then passed over the wire and then a 24-Kenyan Inari introducer sheath was advanced into the inferior vena cava.  The patient was on a continuous heparin drip, but was given an additional bolus of 3000 units of intravenous heparin.  Using a pigtail catheter and Bentson wire, the right atrium, right ventricle, and pulmonary outflow tract were crossed.  The wire was directed to the right main pulmonary artery.  The pigtail catheter was then removed.  A multipurpose catheter was advanced over the wire and used to direct the wire into the right lower lobe pulmonary arteries.  The catheter was advanced over the wire.  The Bentson wire was removed and replaced with a stiff Amplatz wire.  A 24-Kenyan Inari thrombectomy device was advanced over the wire and thrombectomy was performed.  Pulmonary angiogram was then performed in the right pulmonary artery.  This was repeated on the left side, cannulating the distal lower lobe pulmonary arteries and advancing the sheath.  Advancing the device over the stiff wire, the left main pulmonary artery with pulmonary thrombectomy performed.  A pigtail catheter was readvanced through the device and placed in the main pulmonary artery and pulmonary angiogram was performed.  The catheters and wires were then removed.  The sheath was then removed and the pre-placed Perclose sutures were now secured achieving excellent hemostasis in the right femoral region.  Sterile occlusive dressing was then applied.  There were no complications to this procedure.  Estimated blood loss less than 100 mL.  The patient was then transferred back to the intensive care unit in stable condition.    ANGIOGRAPHIC

## 2024-03-21 NOTE — PLAN OF CARE
Problem: Discharge Planning  Goal: Discharge to home or other facility with appropriate resources  3/20/2024 2256 by Wilner Hernandez RN  Outcome: Progressing  3/20/2024 2255 by Wilner Hernandez RN  Outcome: Progressing     Problem: Safety - Adult  Goal: Free from fall injury  3/20/2024 2256 by Wilner Hernandez RN  Outcome: Progressing  3/20/2024 2255 by Wilner Hernandez RN  Outcome: Progressing     Problem: Pain  Goal: Verbalizes/displays adequate comfort level or baseline comfort level  Outcome: Progressing     
  Problem: Discharge Planning  Goal: Discharge to home or other facility with appropriate resources  Outcome: Completed     Problem: Safety - Adult  Goal: Free from fall injury  Outcome: Completed     
4

## 2024-03-31 ENCOUNTER — TELEPHONE (OUTPATIENT)
Dept: CASE MANAGEMENT | Age: 49
End: 2024-03-31

## 2024-04-01 NOTE — PROGRESS NOTES
Physician Progress Note      PATIENT:               NARCISO ABBOTT  CSN #:                  [id number]  :                       1975  ADMIT DATE:       3/20/2024 2:25 AM  DISCH DATE:        3/21/2024 3:56 PM  RESPONDING  PROVIDER #:        Dar Lock MD          QUERY TEXT:    Patient admitted with \"Pulmonary emboli with cor pulmonale\", noted to have   \"Deep venous thrombosis involving the right and left LE.  per doppler study.    If possible, please document in progress notes and discharge summary if you   are evaluating and/or treating any of the following:    The medical record reflects the following:  Risk Factors: PE, DVT -prior history of DVT PE.  He was treated Nov last year   with PA lysis.  Clinical Indicators: Vascular doppler study notes- There is acute totally   occluding deep venous thrombosis involving the right peroneal vein (1 of 2)   and the soleal vein. There is acute totally occluding deep venous thrombosis   involving the left soleal vein.  Treatment: Doppler study, Start the patient on heparin drip, vascular consult,   N.p.o. until cleared by both.  To be admitted to ICU, Pulmonary artery   thrombectomy. Ok to transition to oral anticoagulation- reports intolerant of   Eliquis thus recommend Xarelto    Thank-You, Rowena Will RN, BSN, CCDS  Options provided:  -- Acute totally occluding deep venous thrombosis involving the right peroneal   vein, right soleal vein and left soleal vein.  -- Other - I will add my own diagnosis  -- Disagree - Not applicable / Not valid  -- Disagree - Clinically unable to determine / Unknown  -- Refer to Clinical Documentation Reviewer    PROVIDER RESPONSE TEXT:    This patient has an acute totally occluding deep venous thrombosis involving   the right peroneal vein, right soleal vein, and the left soleal vein.    Query created by: Ramonita Will on 3/29/2024 10:14 AM      Electronically signed by:  Dar Lock MD 2024 7:26 AM

## 2024-04-10 ENCOUNTER — HOSPITAL ENCOUNTER (OUTPATIENT)
Dept: GENERAL RADIOLOGY | Age: 49
Discharge: HOME | End: 2024-04-10
Attending: INTERNAL MEDICINE
Payer: COMMERCIAL

## 2024-04-10 ENCOUNTER — TELEPHONE (OUTPATIENT)
Dept: CARDIOLOGY CLINIC | Age: 49
End: 2024-04-10

## 2024-04-10 ENCOUNTER — HOSPITAL ENCOUNTER (OUTPATIENT)
Dept: MRI IMAGING | Age: 49
Discharge: HOME | End: 2024-04-10
Attending: INTERNAL MEDICINE
Payer: COMMERCIAL

## 2024-04-10 DIAGNOSIS — M79.5 FOREIGN BODY (FB) IN SOFT TISSUE: ICD-10-CM

## 2024-04-10 DIAGNOSIS — Q23.1 BICUSPID AORTIC VALVE: ICD-10-CM

## 2024-04-10 PROCEDURE — 70030 X-RAY EYE FOR FOREIGN BODY: CPT

## 2024-04-10 PROCEDURE — 74018 RADEX ABDOMEN 1 VIEW: CPT

## 2024-04-10 PROCEDURE — A9585 GADOBUTROL INJECTION: HCPCS | Performed by: INTERNAL MEDICINE

## 2024-04-10 PROCEDURE — 75561 CARDIAC MRI FOR MORPH W/DYE: CPT | Performed by: INTERNAL MEDICINE

## 2024-04-10 PROCEDURE — 75565 CARD MRI VELOC FLOW MAPPING: CPT

## 2024-04-10 PROCEDURE — 75565 CARD MRI VELOC FLOW MAPPING: CPT | Performed by: INTERNAL MEDICINE

## 2024-04-10 PROCEDURE — 6360000004 HC RX CONTRAST MEDICATION: Performed by: INTERNAL MEDICINE

## 2024-04-10 RX ORDER — GADOBUTROL 604.72 MG/ML
15 INJECTION INTRAVENOUS
Status: COMPLETED | OUTPATIENT
Start: 2024-04-10 | End: 2024-04-10

## 2024-04-10 RX ADMIN — GADOBUTROL 15 ML: 604.72 INJECTION INTRAVENOUS at 15:26

## 2024-04-10 NOTE — TELEPHONE ENCOUNTER
----- Message from Hermes Mcrae MD sent at 4/10/2024  2:32 PM EDT -----  Let pt know abd xray shows:    Metallic BB over the left abdomen

## 2024-04-10 NOTE — TELEPHONE ENCOUNTER
----- Message from Hermes Mcrae MD sent at 4/10/2024  2:32 PM EDT -----  Let patient know their xray test shows: No orbital metallic foreign body. Thanks.

## 2024-04-11 ENCOUNTER — TELEPHONE (OUTPATIENT)
Dept: CARDIOLOGY CLINIC | Age: 49
End: 2024-04-11

## 2024-04-11 NOTE — TELEPHONE ENCOUNTER
----- Message from Hermes Mcrae MD sent at 4/10/2024  5:13 PM EDT -----  Let patient know their cardiac MRI test shows normal heart function however there is a bicuspid aortic valve, suggest follow-up echocardiogram in 1 years time.  Thanks.

## 2024-06-21 ENCOUNTER — APPOINTMENT (OUTPATIENT)
Dept: CT IMAGING | Age: 49
DRG: 683 | End: 2024-06-21
Payer: MEDICAID

## 2024-06-21 ENCOUNTER — HOSPITAL ENCOUNTER (INPATIENT)
Age: 49
LOS: 3 days | Discharge: HOME | DRG: 683 | End: 2024-06-24
Attending: STUDENT IN AN ORGANIZED HEALTH CARE EDUCATION/TRAINING PROGRAM | Admitting: INTERNAL MEDICINE
Payer: MEDICAID

## 2024-06-21 ENCOUNTER — APPOINTMENT (OUTPATIENT)
Dept: GENERAL RADIOLOGY | Age: 49
DRG: 683 | End: 2024-06-21
Payer: MEDICAID

## 2024-06-21 DIAGNOSIS — A41.9 SEPTICEMIA (HCC): ICD-10-CM

## 2024-06-21 DIAGNOSIS — N17.9 AKI (ACUTE KIDNEY INJURY) (HCC): ICD-10-CM

## 2024-06-21 DIAGNOSIS — E86.0 DEHYDRATION: ICD-10-CM

## 2024-06-21 DIAGNOSIS — R11.2 NAUSEA AND VOMITING, UNSPECIFIED VOMITING TYPE: Primary | ICD-10-CM

## 2024-06-21 DIAGNOSIS — R00.0 TACHYCARDIA: ICD-10-CM

## 2024-06-21 LAB
ALBUMIN SERPL-MCNC: 4.8 G/DL (ref 3.4–5)
ALBUMIN/GLOB SERPL: 1.1 {RATIO} (ref 1.1–2.2)
ALP SERPL-CCNC: 131 U/L (ref 40–129)
ALT SERPL-CCNC: 30 U/L (ref 10–40)
ANION GAP SERPL CALCULATED.3IONS-SCNC: 20 MMOL/L (ref 3–16)
AST SERPL-CCNC: 36 U/L (ref 15–37)
BASOPHILS # BLD: 0.1 K/UL (ref 0–0.2)
BASOPHILS NFR BLD: 0.4 %
BILIRUB SERPL-MCNC: 0.6 MG/DL (ref 0–1)
BUN SERPL-MCNC: 31 MG/DL (ref 7–20)
CALCIUM SERPL-MCNC: 10.4 MG/DL (ref 8.3–10.6)
CHLORIDE SERPL-SCNC: 95 MMOL/L (ref 99–110)
CO2 SERPL-SCNC: 25 MMOL/L (ref 21–32)
CREAT SERPL-MCNC: 3 MG/DL (ref 0.9–1.3)
DEPRECATED RDW RBC AUTO: 14 % (ref 12.4–15.4)
EOSINOPHIL # BLD: 0 K/UL (ref 0–0.6)
EOSINOPHIL NFR BLD: 0 %
ETHANOLAMINE SERPL-MCNC: NORMAL MG/DL (ref 0–0.08)
GFR SERPLBLD CREATININE-BSD FMLA CKD-EPI: 24 ML/MIN/{1.73_M2}
GLUCOSE SERPL-MCNC: 194 MG/DL (ref 70–99)
HCT VFR BLD AUTO: 49 % (ref 40.5–52.5)
HGB BLD-MCNC: 16.2 G/DL (ref 13.5–17.5)
LACTATE BLDV-SCNC: 2.2 MMOL/L (ref 0.4–1.9)
LACTATE BLDV-SCNC: 2.5 MMOL/L (ref 0.4–1.9)
LIPASE SERPL-CCNC: 12 U/L (ref 13–60)
LYMPHOCYTES # BLD: 1.9 K/UL (ref 1–5.1)
LYMPHOCYTES NFR BLD: 8 %
MCH RBC QN AUTO: 28.7 PG (ref 26–34)
MCHC RBC AUTO-ENTMCNC: 33.1 G/DL (ref 31–36)
MCV RBC AUTO: 86.7 FL (ref 80–100)
MONOCYTES # BLD: 1.4 K/UL (ref 0–1.3)
MONOCYTES NFR BLD: 5.6 %
NEUTROPHILS # BLD: 20.8 K/UL (ref 1.7–7.7)
NEUTROPHILS NFR BLD: 86 %
PLATELET # BLD AUTO: 404 K/UL (ref 135–450)
PMV BLD AUTO: 7.6 FL (ref 5–10.5)
POTASSIUM SERPL-SCNC: 5.1 MMOL/L (ref 3.5–5.1)
PROT SERPL-MCNC: 9.3 G/DL (ref 6.4–8.2)
RBC # BLD AUTO: 5.66 M/UL (ref 4.2–5.9)
SODIUM SERPL-SCNC: 140 MMOL/L (ref 136–145)
TROPONIN, HIGH SENSITIVITY: 38 NG/L (ref 0–22)
TROPONIN, HIGH SENSITIVITY: 45 NG/L (ref 0–22)
WBC # BLD AUTO: 24.2 K/UL (ref 4–11)

## 2024-06-21 PROCEDURE — 80053 COMPREHEN METABOLIC PANEL: CPT

## 2024-06-21 PROCEDURE — 74176 CT ABD & PELVIS W/O CONTRAST: CPT

## 2024-06-21 PROCEDURE — 6360000002 HC RX W HCPCS: Performed by: PHYSICIAN ASSISTANT

## 2024-06-21 PROCEDURE — 96361 HYDRATE IV INFUSION ADD-ON: CPT

## 2024-06-21 PROCEDURE — 99285 EMERGENCY DEPT VISIT HI MDM: CPT

## 2024-06-21 PROCEDURE — 6370000000 HC RX 637 (ALT 250 FOR IP): Performed by: INTERNAL MEDICINE

## 2024-06-21 PROCEDURE — 6360000002 HC RX W HCPCS: Performed by: STUDENT IN AN ORGANIZED HEALTH CARE EDUCATION/TRAINING PROGRAM

## 2024-06-21 PROCEDURE — 2580000003 HC RX 258: Performed by: PHYSICIAN ASSISTANT

## 2024-06-21 PROCEDURE — 84484 ASSAY OF TROPONIN QUANT: CPT

## 2024-06-21 PROCEDURE — 96367 TX/PROPH/DG ADDL SEQ IV INF: CPT

## 2024-06-21 PROCEDURE — 96366 THER/PROPH/DIAG IV INF ADDON: CPT

## 2024-06-21 PROCEDURE — 86140 C-REACTIVE PROTEIN: CPT

## 2024-06-21 PROCEDURE — 82077 ASSAY SPEC XCP UR&BREATH IA: CPT

## 2024-06-21 PROCEDURE — 1200000000 HC SEMI PRIVATE

## 2024-06-21 PROCEDURE — 71045 X-RAY EXAM CHEST 1 VIEW: CPT

## 2024-06-21 PROCEDURE — 2580000003 HC RX 258: Performed by: INTERNAL MEDICINE

## 2024-06-21 PROCEDURE — 83690 ASSAY OF LIPASE: CPT

## 2024-06-21 PROCEDURE — 2060000000 HC ICU INTERMEDIATE R&B

## 2024-06-21 PROCEDURE — 93005 ELECTROCARDIOGRAM TRACING: CPT | Performed by: PHYSICIAN ASSISTANT

## 2024-06-21 PROCEDURE — 85025 COMPLETE CBC W/AUTO DIFF WBC: CPT

## 2024-06-21 PROCEDURE — 84145 PROCALCITONIN (PCT): CPT

## 2024-06-21 PROCEDURE — 96365 THER/PROPH/DIAG IV INF INIT: CPT

## 2024-06-21 PROCEDURE — 96375 TX/PRO/DX INJ NEW DRUG ADDON: CPT

## 2024-06-21 PROCEDURE — 83605 ASSAY OF LACTIC ACID: CPT

## 2024-06-21 PROCEDURE — 2500000003 HC RX 250 WO HCPCS: Performed by: PHYSICIAN ASSISTANT

## 2024-06-21 PROCEDURE — 96376 TX/PRO/DX INJ SAME DRUG ADON: CPT

## 2024-06-21 PROCEDURE — 87040 BLOOD CULTURE FOR BACTERIA: CPT

## 2024-06-21 RX ORDER — ACETAMINOPHEN 650 MG/1
650 SUPPOSITORY RECTAL EVERY 6 HOURS PRN
Status: DISCONTINUED | OUTPATIENT
Start: 2024-06-21 | End: 2024-06-24 | Stop reason: HOSPADM

## 2024-06-21 RX ORDER — SODIUM CHLORIDE 0.9 % (FLUSH) 0.9 %
5-40 SYRINGE (ML) INJECTION EVERY 12 HOURS SCHEDULED
Status: DISCONTINUED | OUTPATIENT
Start: 2024-06-21 | End: 2024-06-24 | Stop reason: HOSPADM

## 2024-06-21 RX ORDER — ACETAMINOPHEN 325 MG/1
650 TABLET ORAL EVERY 6 HOURS PRN
Status: DISCONTINUED | OUTPATIENT
Start: 2024-06-21 | End: 2024-06-24 | Stop reason: HOSPADM

## 2024-06-21 RX ORDER — 0.9 % SODIUM CHLORIDE 0.9 %
500 INTRAVENOUS SOLUTION INTRAVENOUS ONCE
Status: COMPLETED | OUTPATIENT
Start: 2024-06-21 | End: 2024-06-21

## 2024-06-21 RX ORDER — ONDANSETRON 2 MG/ML
4 INJECTION INTRAMUSCULAR; INTRAVENOUS ONCE
Status: COMPLETED | OUTPATIENT
Start: 2024-06-21 | End: 2024-06-21

## 2024-06-21 RX ORDER — SODIUM CHLORIDE 0.9 % (FLUSH) 0.9 %
5-40 SYRINGE (ML) INJECTION PRN
Status: DISCONTINUED | OUTPATIENT
Start: 2024-06-21 | End: 2024-06-24 | Stop reason: HOSPADM

## 2024-06-21 RX ORDER — METOPROLOL TARTRATE 1 MG/ML
5 INJECTION, SOLUTION INTRAVENOUS ONCE
Status: COMPLETED | OUTPATIENT
Start: 2024-06-21 | End: 2024-06-21

## 2024-06-21 RX ORDER — SODIUM CHLORIDE 9 MG/ML
INJECTION, SOLUTION INTRAVENOUS PRN
Status: DISCONTINUED | OUTPATIENT
Start: 2024-06-21 | End: 2024-06-24 | Stop reason: HOSPADM

## 2024-06-21 RX ORDER — ENOXAPARIN SODIUM 100 MG/ML
30 INJECTION SUBCUTANEOUS DAILY
Status: DISCONTINUED | OUTPATIENT
Start: 2024-06-22 | End: 2024-06-21

## 2024-06-21 RX ORDER — ONDANSETRON 4 MG/1
4 TABLET, ORALLY DISINTEGRATING ORAL EVERY 8 HOURS PRN
Status: DISCONTINUED | OUTPATIENT
Start: 2024-06-21 | End: 2024-06-24 | Stop reason: HOSPADM

## 2024-06-21 RX ORDER — ONDANSETRON 2 MG/ML
4 INJECTION INTRAMUSCULAR; INTRAVENOUS EVERY 6 HOURS PRN
Status: DISCONTINUED | OUTPATIENT
Start: 2024-06-21 | End: 2024-06-24 | Stop reason: HOSPADM

## 2024-06-21 RX ORDER — 0.9 % SODIUM CHLORIDE 0.9 %
1000 INTRAVENOUS SOLUTION INTRAVENOUS ONCE
Status: COMPLETED | OUTPATIENT
Start: 2024-06-21 | End: 2024-06-21

## 2024-06-21 RX ORDER — SODIUM CHLORIDE, SODIUM LACTATE, POTASSIUM CHLORIDE, CALCIUM CHLORIDE 600; 310; 30; 20 MG/100ML; MG/100ML; MG/100ML; MG/100ML
INJECTION, SOLUTION INTRAVENOUS CONTINUOUS
Status: DISCONTINUED | OUTPATIENT
Start: 2024-06-21 | End: 2024-06-24

## 2024-06-21 RX ORDER — POLYETHYLENE GLYCOL 3350 17 G/17G
17 POWDER, FOR SOLUTION ORAL DAILY PRN
Status: DISCONTINUED | OUTPATIENT
Start: 2024-06-21 | End: 2024-06-24 | Stop reason: HOSPADM

## 2024-06-21 RX ORDER — LORAZEPAM 2 MG/ML
1 INJECTION INTRAMUSCULAR ONCE
Status: COMPLETED | OUTPATIENT
Start: 2024-06-21 | End: 2024-06-21

## 2024-06-21 RX ORDER — SODIUM CHLORIDE, SODIUM LACTATE, POTASSIUM CHLORIDE, AND CALCIUM CHLORIDE .6; .31; .03; .02 G/100ML; G/100ML; G/100ML; G/100ML
1000 INJECTION, SOLUTION INTRAVENOUS ONCE
Status: COMPLETED | OUTPATIENT
Start: 2024-06-22 | End: 2024-06-22

## 2024-06-21 RX ORDER — FAMOTIDINE 20 MG/1
10 TABLET, FILM COATED ORAL DAILY
Status: DISCONTINUED | OUTPATIENT
Start: 2024-06-21 | End: 2024-06-22

## 2024-06-21 RX ORDER — MORPHINE SULFATE 2 MG/ML
2 INJECTION, SOLUTION INTRAMUSCULAR; INTRAVENOUS EVERY 4 HOURS PRN
Status: DISCONTINUED | OUTPATIENT
Start: 2024-06-21 | End: 2024-06-24 | Stop reason: HOSPADM

## 2024-06-21 RX ORDER — ALBUTEROL SULFATE 90 UG/1
2 AEROSOL, METERED RESPIRATORY (INHALATION) EVERY 4 HOURS PRN
Status: DISCONTINUED | OUTPATIENT
Start: 2024-06-21 | End: 2024-06-24 | Stop reason: HOSPADM

## 2024-06-21 RX ADMIN — ACETAMINOPHEN 650 MG: 650 SUPPOSITORY RECTAL at 23:51

## 2024-06-21 RX ADMIN — VANCOMYCIN HYDROCHLORIDE 1500 MG: 10 INJECTION, POWDER, LYOPHILIZED, FOR SOLUTION INTRAVENOUS at 17:55

## 2024-06-21 RX ADMIN — METOPROLOL TARTRATE 5 MG: 5 INJECTION INTRAVENOUS at 17:12

## 2024-06-21 RX ADMIN — LORAZEPAM 1 MG: 2 INJECTION INTRAMUSCULAR; INTRAVENOUS at 19:49

## 2024-06-21 RX ADMIN — ONDANSETRON 4 MG: 2 INJECTION INTRAMUSCULAR; INTRAVENOUS at 19:05

## 2024-06-21 RX ADMIN — SODIUM CHLORIDE 1000 ML: 9 INJECTION, SOLUTION INTRAVENOUS at 15:50

## 2024-06-21 RX ADMIN — CEFEPIME 2000 MG: 2 INJECTION, POWDER, FOR SOLUTION INTRAVENOUS at 17:16

## 2024-06-21 RX ADMIN — SODIUM CHLORIDE 500 ML: 9 INJECTION, SOLUTION INTRAVENOUS at 17:14

## 2024-06-21 RX ADMIN — SODIUM CHLORIDE, POTASSIUM CHLORIDE, SODIUM LACTATE AND CALCIUM CHLORIDE: 600; 310; 30; 20 INJECTION, SOLUTION INTRAVENOUS at 23:35

## 2024-06-21 RX ADMIN — SODIUM CHLORIDE 1000 ML: 9 INJECTION, SOLUTION INTRAVENOUS at 16:40

## 2024-06-21 RX ADMIN — ONDANSETRON 4 MG: 2 INJECTION INTRAMUSCULAR; INTRAVENOUS at 15:50

## 2024-06-21 RX ADMIN — Medication 10 ML: at 23:36

## 2024-06-21 ASSESSMENT — PAIN SCALES - GENERAL
PAINLEVEL_OUTOF10: 3
PAINLEVEL_OUTOF10: 3

## 2024-06-21 ASSESSMENT — LIFESTYLE VARIABLES
HOW MANY STANDARD DRINKS CONTAINING ALCOHOL DO YOU HAVE ON A TYPICAL DAY: PATIENT DOES NOT DRINK
HOW OFTEN DO YOU HAVE A DRINK CONTAINING ALCOHOL: NEVER

## 2024-06-21 ASSESSMENT — PAIN DESCRIPTION - LOCATION
LOCATION: BACK
LOCATION: BACK

## 2024-06-21 NOTE — ED PROVIDER NOTES
[facility] A[facility] CARD TELEMETRY  Emergency Department Encounter    Patient Name: Jf Streeter  MRN: [id number]  YOB: 1975  Date of Evaluation: 6/21/2024  Provider: Jeff Pathak  Note Started: 4:50 PM EDT 6/21/24    CHIEF COMPLAINT  Emesis (Pt vomiting since yesterday. Pt states he does fentanyl and did it last night. )    SHARED SERVICE VISIT  I have seen and evaluated this patient with my supervising physician, Dr. Young.     HISTORY OF PRESENT ILLNESS  Jf Streeter is a 48 y.o. male who presents to the ED for evaluation of right-sided flank pain associated with some nausea and vomiting.  Symptoms began yesterday.  He states that he felt as if he got overheated while working on a vehicle.  Then started vomiting.  Has had no diarrhea or constipation.  Has had some pain in the right flank.  Denies any fevers chills.  No headaches, lightheadedness or dizziness.  Has had sore throat which she attributes to vomiting.  No chest pain or shortness of breath.  He denies drug use although according to triage note he did fentanyl last night.  Denies additional drug or alcohol use.  No pain in the testicles.  Denies urinary symptoms.    No other complaints, modifying factors or associated symptoms.     Nursing notes reviewed were all reviewed and agreed with or any disagreements were addressed in the HPI.    PMH:  Past Medical History:   Diagnosis Date    Asthma     Cervical disc disease     C6-7; work injury 2006;     Encounter for imaging to screen for metal prior to MRI 04/10/2024    XRay Kub positive for BB, per  give patient a heat warning. XRay Orbits cleared for metal fragements per .    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 
90  []RR > 20  [x]WBC > 12 or < 4 or 10% bands    AND:    [x] Infection Confirmed or Suspected.     Must meet 1:    [x]Lactate > 2       or   []Signs of Organ Dysfunction:    - SBP < 90 or MAP < 65  -Creatinine > 2 or increased from baseline  -Urine Output < 0.5 ml/kg/hr  -Bilirubin > 2  -INR > 1.5 (not anticoagulated)  -Platelets < 100,000  -Acute Respiratory Failure as evidenced by new need for NIPPV or mechanical ventilation   Must meet 1:    []Lactate > 4        or   []SBP < 90 or MAP < 65 for at least two readings in the first hour after fluid bolus administration    []Vasopressors initiated (if hypotension persists after fluid resuscitation)   Patient Vitals for the past 6 hrs:   BP Pulse Resp SpO2 Height Weight Weight Method Percent Weight Change   06/21/24 1549 -- -- -- -- 1.753 m (5' 9\") 81.6 kg (180 lb) Stated 0   06/21/24 1551 (!) 123/97 (!) 145 19 95 % -- -- -- --   06/21/24 1706 (!) 149/96 (!) 147 18 94 % -- -- -- --   06/21/24 1805 (!) 158/101 (!) 140 18 95 % -- -- -- --   06/21/24 1821 (!) 159/93 (!) 140 17 95 % -- -- -- --   06/21/24 1906 (!) 166/96 (!) 147 19 95 % -- -- -- --   06/21/24 1919 (!) 156/92 (!) 147 17 97 % -- -- -- --   06/21/24 2019 (!) 167/94 (!) 150 26 -- -- -- -- --   06/21/24 2034 (!) 157/97 (!) 143 22 98 % -- -- -- --   06/21/24 2052 (!) 152/99 (!) 153 21 94 % -- -- -- --      Recent Labs     06/21/24  1553   WBC 24.2*   CREATININE 3.0*   BILITOT 0.6           Severe sepsis identified date: 6/21/2024 time: 4:40pm    Fluid Resuscitation Rationale: at least 30mL/kg based on entered actual weight at time of triage    Repeat lactate level: improving    Reassessment Exam: I have reassessed tissue perfusion and hemodynamic status after fluid bolus at this date/time: 6/21/2024 9PM      I, Dr. Young am the primary clinician of record.   I personally saw the patient and independently provided 31 minutes of non-concurrent critical care out of the total shared critical care time

## 2024-06-21 NOTE — CONSULTS
Pharmacy Note  Vancomycin Consult    Jf Streeter is a 48 y.o. male started on Vancomycin for sepsis of unknown origin; consult received from [place] TURCIOS to manage therapy. Also receiving the following antibiotics: Cefepime.    Allergies:  Codeine, Hydrocodone, and Hydrocodone-acetaminophen     Tmax: 98.3      Recent Labs     06/21/24  1553   CREATININE 3.0*       Recent Labs     06/21/24  1553   WBC 24.2*       Estimated Creatinine Clearance: 29 mL/min (A) (based on SCr of 3 mg/dL (H)).    No intake or output data in the 24 hours ending 06/21/24 1647    Wt Readings from Last 1 Encounters:   06/21/24 81.6 kg (180 lb)         Body mass index is 26.58 kg/m².    Loading dose (critically ill or in ICU, require dialysis or renal replacement therapy): Vancomycin 25 mg/kg IVPB x 1 (maximum 2500 mg).  Maintenance dose: 10-20 mg/kg (maximum: 2000 mg/dose and 4500 mg/day) starting at the next dosing interval determined by renal function  Pulse dose: fluctuating renal function, GARETT, ESRD  CRRT: 7.5-10 mg/kg q12h   Goal Vancomycin trough: 15-20 mcg/mL   Goal Vancomycin AUC: 400-600     Assessment/Plan:  Will initiate Vancomycin with a one time loading dose of 1500 mg x1. Pharmacy will continue to dose and manage Vancomycin if consulted by inpatient hospitalist.    Thank you for the consult.    Claudette Jack, PharmD  6/21/2024 at 4:48 PM

## 2024-06-22 LAB
AMPHETAMINES UR QL SCN>1000 NG/ML: ABNORMAL
ANION GAP SERPL CALCULATED.3IONS-SCNC: 13 MMOL/L (ref 3–16)
BACTERIA URNS QL MICRO: ABNORMAL /HPF
BARBITURATES UR QL SCN>200 NG/ML: ABNORMAL
BASOPHILS # BLD: 0.1 K/UL (ref 0–0.2)
BASOPHILS NFR BLD: 0.2 %
BENZODIAZ UR QL SCN>200 NG/ML: ABNORMAL
BILIRUB UR QL STRIP.AUTO: ABNORMAL
BUN SERPL-MCNC: 30 MG/DL (ref 7–20)
CALCIUM SERPL-MCNC: 8.8 MG/DL (ref 8.3–10.6)
CANNABINOIDS UR QL SCN>50 NG/ML: ABNORMAL
CHLORIDE SERPL-SCNC: 107 MMOL/L (ref 99–110)
CLARITY UR: CLEAR
CO2 SERPL-SCNC: 25 MMOL/L (ref 21–32)
COCAINE UR QL SCN: POSITIVE
COLOR UR: YELLOW
CREAT SERPL-MCNC: 1.8 MG/DL (ref 0.9–1.3)
CRP SERPL-MCNC: 36.9 MG/L (ref 0–5.1)
DEPRECATED RDW RBC AUTO: 14.5 % (ref 12.4–15.4)
DRUG SCREEN COMMENT UR-IMP: ABNORMAL
EKG ATRIAL RATE: 141 BPM
EKG DIAGNOSIS: NORMAL
EKG P AXIS: 64 DEGREES
EKG P-R INTERVAL: 182 MS
EKG Q-T INTERVAL: 224 MS
EKG QRS DURATION: 66 MS
EKG QTC CALCULATION (BAZETT): 343 MS
EKG R AXIS: 69 DEGREES
EKG T AXIS: 39 DEGREES
EKG VENTRICULAR RATE: 141 BPM
EOSINOPHIL # BLD: 0 K/UL (ref 0–0.6)
EOSINOPHIL NFR BLD: 0 %
FENTANYL SCREEN, URINE: POSITIVE
GFR SERPLBLD CREATININE-BSD FMLA CKD-EPI: 45 ML/MIN/{1.73_M2}
GLUCOSE SERPL-MCNC: 132 MG/DL (ref 70–99)
GLUCOSE UR STRIP.AUTO-MCNC: NEGATIVE MG/DL
HCT VFR BLD AUTO: 33.6 % (ref 40.5–52.5)
HCT VFR BLD AUTO: 38.9 % (ref 40.5–52.5)
HGB BLD-MCNC: 11.1 G/DL (ref 13.5–17.5)
HGB BLD-MCNC: 12.6 G/DL (ref 13.5–17.5)
HGB UR QL STRIP.AUTO: ABNORMAL
KETONES UR STRIP.AUTO-MCNC: 40 MG/DL
LACTATE BLDV-SCNC: 0.9 MMOL/L (ref 0.4–2)
LACTATE BLDV-SCNC: 0.9 MMOL/L (ref 0.4–2)
LACTATE BLDV-SCNC: 1.1 MMOL/L (ref 0.4–2)
LACTATE BLDV-SCNC: 2.2 MMOL/L (ref 0.4–2)
LEUKOCYTE ESTERASE UR QL STRIP.AUTO: NEGATIVE
LYMPHOCYTES # BLD: 2.2 K/UL (ref 1–5.1)
LYMPHOCYTES NFR BLD: 9 %
MCH RBC QN AUTO: 27.9 PG (ref 26–34)
MCHC RBC AUTO-ENTMCNC: 32.3 G/DL (ref 31–36)
MCV RBC AUTO: 86.5 FL (ref 80–100)
METHADONE UR QL SCN>300 NG/ML: ABNORMAL
MONOCYTES # BLD: 1.5 K/UL (ref 0–1.3)
MONOCYTES NFR BLD: 5.9 %
MUCOUS THREADS #/AREA URNS LPF: ABNORMAL /LPF
NEUTROPHILS # BLD: 20.9 K/UL (ref 1.7–7.7)
NEUTROPHILS NFR BLD: 84.9 %
NITRITE UR QL STRIP.AUTO: NEGATIVE
OPIATES UR QL SCN>300 NG/ML: ABNORMAL
OXYCODONE UR QL SCN: ABNORMAL
PCP UR QL SCN>25 NG/ML: ABNORMAL
PH UR STRIP.AUTO: 6.5 [PH] (ref 5–8)
PH UR STRIP: 6.5 [PH]
PLATELET # BLD AUTO: 294 K/UL (ref 135–450)
PMV BLD AUTO: 7.4 FL (ref 5–10.5)
POTASSIUM SERPL-SCNC: 4.3 MMOL/L (ref 3.5–5.1)
PROCALCITONIN SERPL IA-MCNC: 0.18 NG/ML (ref 0–0.15)
PROT UR STRIP.AUTO-MCNC: ABNORMAL MG/DL
RBC # BLD AUTO: 4.49 M/UL (ref 4.2–5.9)
RBC #/AREA URNS HPF: ABNORMAL /HPF (ref 0–4)
SODIUM SERPL-SCNC: 145 MMOL/L (ref 136–145)
SP GR UR STRIP.AUTO: 1.02 (ref 1–1.03)
UA DIPSTICK W REFLEX MICRO PNL UR: YES
URN SPEC COLLECT METH UR: ABNORMAL
UROBILINOGEN UR STRIP-ACNC: 0.2 E.U./DL
VANCOMYCIN SERPL-MCNC: 11.1 UG/ML
WBC # BLD AUTO: 24.6 K/UL (ref 4–11)
WBC #/AREA URNS HPF: ABNORMAL /HPF (ref 0–5)

## 2024-06-22 PROCEDURE — 6370000000 HC RX 637 (ALT 250 FOR IP): Performed by: INTERNAL MEDICINE

## 2024-06-22 PROCEDURE — 80307 DRUG TEST PRSMV CHEM ANLYZR: CPT

## 2024-06-22 PROCEDURE — 87086 URINE CULTURE/COLONY COUNT: CPT

## 2024-06-22 PROCEDURE — 2580000003 HC RX 258: Performed by: INTERNAL MEDICINE

## 2024-06-22 PROCEDURE — 85014 HEMATOCRIT: CPT

## 2024-06-22 PROCEDURE — 81001 URINALYSIS AUTO W/SCOPE: CPT

## 2024-06-22 PROCEDURE — 93010 ELECTROCARDIOGRAM REPORT: CPT | Performed by: INTERNAL MEDICINE

## 2024-06-22 PROCEDURE — 1200000000 HC SEMI PRIVATE

## 2024-06-22 PROCEDURE — 80048 BASIC METABOLIC PNL TOTAL CA: CPT

## 2024-06-22 PROCEDURE — C9113 INJ PANTOPRAZOLE SODIUM, VIA: HCPCS | Performed by: INTERNAL MEDICINE

## 2024-06-22 PROCEDURE — 80202 ASSAY OF VANCOMYCIN: CPT

## 2024-06-22 PROCEDURE — 6360000002 HC RX W HCPCS: Performed by: INTERNAL MEDICINE

## 2024-06-22 PROCEDURE — 85018 HEMOGLOBIN: CPT

## 2024-06-22 PROCEDURE — 85025 COMPLETE CBC W/AUTO DIFF WBC: CPT

## 2024-06-22 PROCEDURE — 36415 COLL VENOUS BLD VENIPUNCTURE: CPT

## 2024-06-22 PROCEDURE — 83605 ASSAY OF LACTIC ACID: CPT

## 2024-06-22 PROCEDURE — 87449 NOS EACH ORGANISM AG IA: CPT

## 2024-06-22 RX ORDER — FAMOTIDINE 20 MG/1
20 TABLET, FILM COATED ORAL DAILY
Status: DISCONTINUED | OUTPATIENT
Start: 2024-06-22 | End: 2024-06-22

## 2024-06-22 RX ORDER — PROCHLORPERAZINE EDISYLATE 5 MG/ML
10 INJECTION INTRAMUSCULAR; INTRAVENOUS EVERY 6 HOURS PRN
Status: DISCONTINUED | OUTPATIENT
Start: 2024-06-22 | End: 2024-06-24 | Stop reason: HOSPADM

## 2024-06-22 RX ORDER — PANTOPRAZOLE SODIUM 40 MG/10ML
40 INJECTION, POWDER, LYOPHILIZED, FOR SOLUTION INTRAVENOUS DAILY
Status: DISCONTINUED | OUTPATIENT
Start: 2024-06-22 | End: 2024-06-24 | Stop reason: HOSPADM

## 2024-06-22 RX ADMIN — SODIUM CHLORIDE, POTASSIUM CHLORIDE, SODIUM LACTATE AND CALCIUM CHLORIDE: 600; 310; 30; 20 INJECTION, SOLUTION INTRAVENOUS at 21:37

## 2024-06-22 RX ADMIN — VANCOMYCIN HYDROCHLORIDE 1000 MG: 1 INJECTION, POWDER, LYOPHILIZED, FOR SOLUTION INTRAVENOUS at 06:49

## 2024-06-22 RX ADMIN — PANTOPRAZOLE SODIUM 40 MG: 40 INJECTION, POWDER, FOR SOLUTION INTRAVENOUS at 12:03

## 2024-06-22 RX ADMIN — MORPHINE SULFATE 2 MG: 2 INJECTION, SOLUTION INTRAMUSCULAR; INTRAVENOUS at 18:47

## 2024-06-22 RX ADMIN — PHENOL 1 SPRAY: 1.5 LIQUID ORAL at 15:57

## 2024-06-22 RX ADMIN — PROCHLORPERAZINE EDISYLATE 10 MG: 5 INJECTION INTRAMUSCULAR; INTRAVENOUS at 12:03

## 2024-06-22 RX ADMIN — Medication 10 ML: at 20:13

## 2024-06-22 RX ADMIN — ONDANSETRON 4 MG: 2 INJECTION INTRAMUSCULAR; INTRAVENOUS at 08:53

## 2024-06-22 RX ADMIN — PROCHLORPERAZINE EDISYLATE 10 MG: 5 INJECTION INTRAMUSCULAR; INTRAVENOUS at 18:11

## 2024-06-22 RX ADMIN — PHENOL 1 SPRAY: 1.5 LIQUID ORAL at 18:46

## 2024-06-22 RX ADMIN — PHENOL 1 SPRAY: 1.5 LIQUID ORAL at 13:02

## 2024-06-22 RX ADMIN — SODIUM CHLORIDE, POTASSIUM CHLORIDE, SODIUM LACTATE AND CALCIUM CHLORIDE: 600; 310; 30; 20 INJECTION, SOLUTION INTRAVENOUS at 10:55

## 2024-06-22 RX ADMIN — SODIUM CHLORIDE, POTASSIUM CHLORIDE, SODIUM LACTATE AND CALCIUM CHLORIDE 1000 ML: 600; 310; 30; 20 INJECTION, SOLUTION INTRAVENOUS at 00:30

## 2024-06-22 RX ADMIN — CEFEPIME 2000 MG: 2 INJECTION, POWDER, FOR SOLUTION INTRAVENOUS at 17:51

## 2024-06-22 RX ADMIN — MORPHINE SULFATE 2 MG: 2 INJECTION, SOLUTION INTRAMUSCULAR; INTRAVENOUS at 13:02

## 2024-06-22 RX ADMIN — FAMOTIDINE 20 MG: 20 TABLET, FILM COATED ORAL at 08:53

## 2024-06-22 RX ADMIN — Medication 10 ML: at 08:53

## 2024-06-22 RX ADMIN — RIVAROXABAN 20 MG: 20 TABLET, FILM COATED ORAL at 08:53

## 2024-06-22 ASSESSMENT — PAIN DESCRIPTION - DESCRIPTORS: DESCRIPTORS: ACHING;DISCOMFORT;CRAMPING

## 2024-06-22 ASSESSMENT — PAIN SCALES - GENERAL
PAINLEVEL_OUTOF10: 9
PAINLEVEL_OUTOF10: 7

## 2024-06-22 ASSESSMENT — PAIN - FUNCTIONAL ASSESSMENT: PAIN_FUNCTIONAL_ASSESSMENT: ACTIVITIES ARE NOT PREVENTED

## 2024-06-22 ASSESSMENT — PAIN DESCRIPTION - ORIENTATION: ORIENTATION: RIGHT

## 2024-06-22 ASSESSMENT — PAIN DESCRIPTION - LOCATION: LOCATION: ABDOMEN

## 2024-06-22 NOTE — H&P
Hospital Medicine History & Physical      PCP: Jeff Pathak    Date of Admission: 6/21/2024    Date of Service: Pt seen/examined and Admitted with expected LOS greater than two midnights due to medical therapy.     Chief Complaint:      Nausea and vomiting, generalized weakness fatigue right sided abdominal pain for 1 day    History Of Present Illness:      48 y.o. male who presented to the emergency room with a chief complaint of    Upon arrival to the emergency room patient is alert awake feeling very weak unable to answer all the questions.  Patient was significant other who is present in the room provided most of the data    Patient's past medical history significant for pulmonary emboli on Xarelto with recent hospitalization, hypertension, anxiety.  Patient reports that he uses fentanyl and he has been having generalized bodyaches and feeling overall pain.    Patient is noticed to be tachycardic hypertensive  Upon further questioning patient's wife explains that patient has been working in his car 3 to 4 hours outdoor.    After that he complained of nausea vomiting headaches abdominal pain      Patient denies any shortness of breath no palpitation orthopnea dyspnea with exertion no diaphoresis.    Patient denies any muscle weakness paralysis or loss of sensation no blurry vision no double vision no neck stiffness.  No recent traveling no history of exposure to sick contacts    The workup in the emergency room notable for:    Patient meets SIRS sepsis criteria with elevated heart rate tachycardia leukocytosis infection, no elevated lactate    Twelve-lead EKG done in the emergency room showed sinus tachycardia no acute ischemic patient complaining of right-sided tenderness as well.    Laboratory workup:  CBC significant for  Leukocytosis of 24.2  H&H stable  Elevated BUN/creatinine 31/3  Elevated BUN troponin 45  Repeat troponin 38  Elevated lactic acid 2.5, repeat lactic acid 2.2 after IV fluid

## 2024-06-22 NOTE — RT PROTOCOL NOTE
RT Inhaler-Nebulizer Bronchodilator Protocol Note    There is a bronchodilator order in the chart from a provider indicating to follow the RT Bronchodilator Protocol and there is an “Initiate RT Inhaler-Nebulizer Bronchodilator Protocol” order as well (see protocol at bottom of note).    CXR Findings:  XR CHEST PORTABLE    Result Date: 6/21/2024  1. No acute cardiopulmonary process. 2. Stable nodular lesion in the left lung base.       The findings from the last RT Protocol Assessment were as follows:   History Pulmonary Disease: Chronic pulmonary disease  Respiratory Pattern: Regular pattern and RR 12-20 bpm  Breath Sounds: Clear breath sounds  Cough: Strong, spontaneous, non-productive  Indication for Bronchodilator Therapy: On home bronchodilators  Bronchodilator Assessment Score: 2    Aerosolized bronchodilator medication orders have been revised according to the RT Inhaler-Nebulizer Bronchodilator Protocol below.    Respiratory Therapist to perform RT Therapy Protocol Assessment initially then follow the protocol.  Repeat RT Therapy Protocol Assessment PRN for score 0-3 or on second treatment, BID, and PRN for scores above 3.    No Indications - adjust the frequency to every 6 hours PRN wheezing or bronchospasm, if no treatments needed after 48 hours then discontinue using Per Protocol order mode.     If indication present, adjust the RT bronchodilator orders based on the Bronchodilator Assessment Score as indicated below.  Use Inhaler orders unless patient has one or more of the following: on home nebulizer, not able to hold breath for 10 seconds, is not alert and oriented, cannot activate and use MDI correctly, or respiratory rate 25 breaths per minute or more, then use the equivalent nebulizer order(s) with same Frequency and PRN reasons based on the score.  If a patient is on this medication at home then do not decrease Frequency below that used at home.    0-3 - enter or revise RT bronchodilator order(s)

## 2024-06-22 NOTE — ED NOTES
212 @ 6512  
Patient unable to provide urine sample, aware of the need for a specimen.   
Patient unable to provide urine sample, aware of the need for a specimen.   
without the administration of intravenous contrast. Multiplanar reformatted images are provided for review. Automated exposure control, iterative reconstruction, and/or weight based adjustment of the mA/kV was utilized to reduce the radiation dose to as low as reasonably achievable. COMPARISON: CT chest done 03/20/2024. HISTORY: ORDERING SYSTEM PROVIDED HISTORY: flank pain TECHNOLOGIST PROVIDED HISTORY: Reason for exam:->flank pain Additional Contrast?->None Decision Support Exception - unselect if not a suspected or confirmed emergency medical condition->Emergency Medical Condition (MA) Reason for Exam: vomiting, x today, flank pain Additional signs and symptoms: right side FINDINGS: Lower Chest: Stable 2.8 cm left lower lobe pulmonary nodule with internal calcification.  1.0 cm pulmonary nodule in the inferior right middle lobe is stable in size from the prior study but demonstrates decreased cavitation. Please refer to prior chest CT report from 03/20/2024.  No pericardial or pleural effusion.  Distal esophageal wall thickening may relate to reflux esophagitis. Liver: Normal. Gallbladder and Bile Ducts: Normal. Spleen: Normal. Adrenal Glands: Normal. Pancreas: Normal. Genitourinary: No acute abnormality.  No urinary stones or hydronephrosis. Bowel: Normal caliber bowel.  Normal appendix.  No significant diverticular disease. Vasculature: Mild atherosclerosis.  Normal caliber abdominal aorta. Bones and Soft Tissues: No acute abnormality. Retroperitoneum/Mesentery: No intraperitoneal free air, ascites or fluid collection. No lymphadenopathy in the abdomen or pelvis.     1.  No acute abnormality in the abdomen or pelvis. 2.  Normal caliber bowel and normal appendix. 3.  No obstructive uropathy. 4.  Distal esophageal wall thickening may relate to esophagitis.     XR CHEST PORTABLE    Result Date: 6/21/2024  EXAMINATION: ONE XRAY VIEW OF THE CHEST 6/21/2024 3:23 pm COMPARISON: CT, 03/20/2024 HISTORY: ORDERING SYSTEM

## 2024-06-22 NOTE — CONSULTS
Pharmacy Note  Vancomycin Consult    Jf Streeter is a 48 y.o. male started on Vancomycin for Sepsis for unknown; consult received from Dr. Niles Bass  to manage therapy. Also receiving the following antibiotics: Cefepime.    Allergies:  Codeine, Hydrocodone, and Hydrocodone-acetaminophen     Tmax: 98.3      Recent Labs     06/21/24  1553   CREATININE 3.0*       Recent Labs     06/21/24  1553   WBC 24.2*       Estimated Creatinine Clearance: 29 mL/min (A) (based on SCr of 3 mg/dL (H)).    No intake or output data in the 24 hours ending 06/21/24 2149    Wt Readings from Last 1 Encounters:   06/21/24 81.6 kg (180 lb)         Body mass index is 26.58 kg/m².    Loading dose (critically ill or in ICU, require dialysis or renal replacement therapy): Vancomycin 25 mg/kg IVPB x 1 (maximum 2500 mg).  Maintenance dose: 10-20 mg/kg (maximum: 2000 mg/dose and 4500 mg/day) starting at the next dosing interval determined by renal function  Pulse dose: fluctuating renal function, GARETT, ESRD  CRRT: 7.5-10 mg/kg q12h   Goal Vancomycin trough: 15-20 mcg/mL   Goal Vancomycin AUC: 400-600     Assessment/Plan:  Received Vancomycin with a one time loading dose of 1500 mg x1. With Scr of 3.0, intermittent dosing until renal functions improve. Level tomorrow AM   Thank you for the consult.    Claudette Jack, PharmD  6/21/2024 at 9:51 PM      Vancomycin Level, Random [[id number]]    Collected: 06/22/24 0458    Updated: 06/22/24 0532    Specimen Type: Blood     Vancomycin Rm 11.1 ug/mL     Recent Labs     06/21/24  1553 06/22/24  0458   CREATININE 3.0* 1.8*       Estimated Creatinine Clearance: 49 mL/min (A) (based on SCr of 1.8 mg/dL (H)).  Give Vancomycin 1000 mg x1  Vanc random 6/23 0600  [facility] 6/22/2024 5:52 AM    Vancomycin Level, Random [[id number]]    Collected: 06/23/24 0526    Updated: 06/23/24 0611    Specimen Type: Blood     Vancomycin Rm 4.3 ug/mL     Recent Labs     06/21/24  1553 06/22/24  0453

## 2024-06-23 LAB
ANION GAP SERPL CALCULATED.3IONS-SCNC: 8 MMOL/L (ref 3–16)
BACTERIA UR CULT: NORMAL
BASOPHILS # BLD: 0.1 K/UL (ref 0–0.2)
BASOPHILS NFR BLD: 0.5 %
BUN SERPL-MCNC: 23 MG/DL (ref 7–20)
CALCIUM SERPL-MCNC: 8.4 MG/DL (ref 8.3–10.6)
CHLORIDE SERPL-SCNC: 105 MMOL/L (ref 99–110)
CO2 SERPL-SCNC: 26 MMOL/L (ref 21–32)
CREAT SERPL-MCNC: 1.2 MG/DL (ref 0.9–1.3)
DEPRECATED RDW RBC AUTO: 13.9 % (ref 12.4–15.4)
EOSINOPHIL # BLD: 0 K/UL (ref 0–0.6)
EOSINOPHIL NFR BLD: 0.2 %
GFR SERPLBLD CREATININE-BSD FMLA CKD-EPI: 73 ML/MIN/{1.73_M2}
GLUCOSE SERPL-MCNC: 121 MG/DL (ref 70–99)
HCT VFR BLD AUTO: 33.8 % (ref 40.5–52.5)
HCT VFR BLD AUTO: 34.2 % (ref 40.5–52.5)
HCT VFR BLD AUTO: 36.5 % (ref 40.5–52.5)
HGB BLD-MCNC: 10.9 G/DL (ref 13.5–17.5)
HGB BLD-MCNC: 11.3 G/DL (ref 13.5–17.5)
HGB BLD-MCNC: 11.8 G/DL (ref 13.5–17.5)
LACTATE BLDV-SCNC: 1.1 MMOL/L (ref 0.4–2)
LACTATE BLDV-SCNC: 1.4 MMOL/L (ref 0.4–2)
LEGIONELLA AG UR QL: NORMAL
LYMPHOCYTES # BLD: 3.1 K/UL (ref 1–5.1)
LYMPHOCYTES NFR BLD: 20.7 %
MCH RBC QN AUTO: 28.3 PG (ref 26–34)
MCHC RBC AUTO-ENTMCNC: 32.4 G/DL (ref 31–36)
MCV RBC AUTO: 87.3 FL (ref 80–100)
MONOCYTES # BLD: 0.7 K/UL (ref 0–1.3)
MONOCYTES NFR BLD: 5 %
NEUTROPHILS # BLD: 10.8 K/UL (ref 1.7–7.7)
NEUTROPHILS NFR BLD: 73.6 %
PLATELET # BLD AUTO: 220 K/UL (ref 135–450)
PMV BLD AUTO: 7.4 FL (ref 5–10.5)
POTASSIUM SERPL-SCNC: 4.1 MMOL/L (ref 3.5–5.1)
RBC # BLD AUTO: 3.87 M/UL (ref 4.2–5.9)
SODIUM SERPL-SCNC: 139 MMOL/L (ref 136–145)
VANCOMYCIN SERPL-MCNC: 4.3 UG/ML
WBC # BLD AUTO: 14.7 K/UL (ref 4–11)

## 2024-06-23 PROCEDURE — 6360000002 HC RX W HCPCS: Performed by: INTERNAL MEDICINE

## 2024-06-23 PROCEDURE — 36415 COLL VENOUS BLD VENIPUNCTURE: CPT

## 2024-06-23 PROCEDURE — 6370000000 HC RX 637 (ALT 250 FOR IP): Performed by: INTERNAL MEDICINE

## 2024-06-23 PROCEDURE — 2580000003 HC RX 258: Performed by: INTERNAL MEDICINE

## 2024-06-23 PROCEDURE — 85014 HEMATOCRIT: CPT

## 2024-06-23 PROCEDURE — 80202 ASSAY OF VANCOMYCIN: CPT

## 2024-06-23 PROCEDURE — 83605 ASSAY OF LACTIC ACID: CPT

## 2024-06-23 PROCEDURE — 85018 HEMOGLOBIN: CPT

## 2024-06-23 PROCEDURE — 97530 THERAPEUTIC ACTIVITIES: CPT

## 2024-06-23 PROCEDURE — 80048 BASIC METABOLIC PNL TOTAL CA: CPT

## 2024-06-23 PROCEDURE — 97161 PT EVAL LOW COMPLEX 20 MIN: CPT

## 2024-06-23 PROCEDURE — 97116 GAIT TRAINING THERAPY: CPT

## 2024-06-23 PROCEDURE — 85025 COMPLETE CBC W/AUTO DIFF WBC: CPT

## 2024-06-23 PROCEDURE — 1200000000 HC SEMI PRIVATE

## 2024-06-23 PROCEDURE — C9113 INJ PANTOPRAZOLE SODIUM, VIA: HCPCS | Performed by: INTERNAL MEDICINE

## 2024-06-23 PROCEDURE — 6360000002 HC RX W HCPCS: Performed by: NURSE PRACTITIONER

## 2024-06-23 PROCEDURE — 99255 IP/OBS CONSLTJ NEW/EST HI 80: CPT | Performed by: INTERNAL MEDICINE

## 2024-06-23 PROCEDURE — 97165 OT EVAL LOW COMPLEX 30 MIN: CPT

## 2024-06-23 RX ORDER — HYDRALAZINE HYDROCHLORIDE 20 MG/ML
10 INJECTION INTRAMUSCULAR; INTRAVENOUS EVERY 6 HOURS PRN
Status: DISCONTINUED | OUTPATIENT
Start: 2024-06-23 | End: 2024-06-24 | Stop reason: HOSPADM

## 2024-06-23 RX ORDER — LIDOCAINE HYDROCHLORIDE 20 MG/ML
5 SOLUTION OROPHARYNGEAL
Status: DISCONTINUED | OUTPATIENT
Start: 2024-06-23 | End: 2024-06-24 | Stop reason: HOSPADM

## 2024-06-23 RX ADMIN — PROCHLORPERAZINE EDISYLATE 10 MG: 5 INJECTION INTRAMUSCULAR; INTRAVENOUS at 12:41

## 2024-06-23 RX ADMIN — PANTOPRAZOLE SODIUM 40 MG: 40 INJECTION, POWDER, FOR SOLUTION INTRAVENOUS at 08:54

## 2024-06-23 RX ADMIN — SODIUM CHLORIDE, POTASSIUM CHLORIDE, SODIUM LACTATE AND CALCIUM CHLORIDE: 600; 310; 30; 20 INJECTION, SOLUTION INTRAVENOUS at 07:19

## 2024-06-23 RX ADMIN — LIDOCAINE HYDROCHLORIDE 5 ML: 20 SOLUTION ORAL at 16:26

## 2024-06-23 RX ADMIN — Medication 10 ML: at 08:55

## 2024-06-23 RX ADMIN — LIDOCAINE HYDROCHLORIDE 5 ML: 20 SOLUTION ORAL at 21:43

## 2024-06-23 RX ADMIN — RIVAROXABAN 20 MG: 20 TABLET, FILM COATED ORAL at 08:54

## 2024-06-23 RX ADMIN — PHENOL 1 SPRAY: 1.5 LIQUID ORAL at 01:04

## 2024-06-23 RX ADMIN — MORPHINE SULFATE 2 MG: 2 INJECTION, SOLUTION INTRAMUSCULAR; INTRAVENOUS at 01:04

## 2024-06-23 RX ADMIN — PROCHLORPERAZINE EDISYLATE 10 MG: 5 INJECTION INTRAMUSCULAR; INTRAVENOUS at 01:10

## 2024-06-23 RX ADMIN — LIDOCAINE HYDROCHLORIDE 5 ML: 20 SOLUTION ORAL at 12:41

## 2024-06-23 RX ADMIN — HYDRALAZINE HYDROCHLORIDE 10 MG: 20 INJECTION, SOLUTION INTRAMUSCULAR; INTRAVENOUS at 22:53

## 2024-06-23 RX ADMIN — PROCHLORPERAZINE EDISYLATE 10 MG: 5 INJECTION INTRAMUSCULAR; INTRAVENOUS at 23:56

## 2024-06-23 RX ADMIN — CEFEPIME 2000 MG: 2 INJECTION, POWDER, FOR SOLUTION INTRAVENOUS at 09:01

## 2024-06-23 RX ADMIN — MORPHINE SULFATE 2 MG: 2 INJECTION, SOLUTION INTRAMUSCULAR; INTRAVENOUS at 08:54

## 2024-06-23 RX ADMIN — Medication 10 ML: at 21:44

## 2024-06-23 RX ADMIN — VANCOMYCIN HYDROCHLORIDE 1000 MG: 1 INJECTION, POWDER, LYOPHILIZED, FOR SOLUTION INTRAVENOUS at 07:14

## 2024-06-23 RX ADMIN — SODIUM CHLORIDE, POTASSIUM CHLORIDE, SODIUM LACTATE AND CALCIUM CHLORIDE: 600; 310; 30; 20 INJECTION, SOLUTION INTRAVENOUS at 16:23

## 2024-06-23 ASSESSMENT — PAIN DESCRIPTION - LOCATION
LOCATION: THROAT
LOCATION: BACK
LOCATION: THROAT

## 2024-06-23 ASSESSMENT — PAIN DESCRIPTION - ORIENTATION
ORIENTATION: RIGHT
ORIENTATION: MID

## 2024-06-23 ASSESSMENT — PAIN SCALES - GENERAL
PAINLEVEL_OUTOF10: 3
PAINLEVEL_OUTOF10: 8
PAINLEVEL_OUTOF10: 8
PAINLEVEL_OUTOF10: 5
PAINLEVEL_OUTOF10: 7
PAINLEVEL_OUTOF10: 5

## 2024-06-23 ASSESSMENT — PAIN DESCRIPTION - FREQUENCY: FREQUENCY: CONTINUOUS

## 2024-06-23 ASSESSMENT — PAIN DESCRIPTION - PAIN TYPE: TYPE: ACUTE PAIN

## 2024-06-23 ASSESSMENT — PAIN - FUNCTIONAL ASSESSMENT
PAIN_FUNCTIONAL_ASSESSMENT: ACTIVITIES ARE NOT PREVENTED
PAIN_FUNCTIONAL_ASSESSMENT: ACTIVITIES ARE NOT PREVENTED

## 2024-06-23 ASSESSMENT — PAIN DESCRIPTION - DESCRIPTORS
DESCRIPTORS: SHARP
DESCRIPTORS: ACHING;BURNING
DESCRIPTORS: BURNING;DISCOMFORT
DESCRIPTORS: ACHING;DISCOMFORT;BURNING

## 2024-06-23 ASSESSMENT — PAIN DESCRIPTION - ONSET: ONSET: ON-GOING

## 2024-06-23 NOTE — CONSULTS
Infectious Diseases Inpatient Consult Note    Reason for Consult:   Leukocytosis   Requesting Physician:   Dr Em  Primary Care Physician:  Jeff Pathak  History Obtained From:   Pt, EPIC    Admit Date: 6/21/2024  Hospital Day: 3    CHIEF COMPLAINT:       Chief Complaint   Patient presents with    Emesis     Pt vomiting since yesterday. Pt states he does fentanyl and did it last night.        HISTORY OF PRESENT ILLNESS:      49 yo man  PMH - HTN, hx PE, hx Histoplasmosis (fibrosing mediastinitis 2005), cervical spine injury  PSurgHx - pleural bx 2005    Presents with fatigue, nausea / vomiting  Substance abuse - use fentanyl evening prior to presentation  No fever / chills    In ED 6/21, afeb, WBC 24.2, Cr 3, LA 2.5  IVF, started on Vancomycin and Cefepime  Tm 100.6    Today 6/23, afebrile, RA  WBC 14.7.  Cr 1.2  C/o 'throat hurts from throwing up'      Past Medical History:    Past Medical History:   Diagnosis Date    Asthma     Cervical disc disease     C6-7; work injury 2006;     Encounter for imaging to screen for metal prior to MRI 04/10/2024    XRay Kub positive for BB, per  give patient a heat warning. XRay Orbits cleared for metal fragements per .    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    Histoplasmosis     Injury of nerve of wrist or hand 01/01/2009    saw injury left hand       Past Surgical History:    Past Surgical History:   Procedure Laterality Date    PLEURAL BIOPSY Right 2005    SHOULDER SURGERY Left     for dislocation       Current Medications:     vancomycin  1,000 mg IntraVENous Q12H    cefepime  2,000 mg IntraVENous Q12H    rivaroxaban  20 mg Oral Daily with breakfast    pantoprazole  40 mg IntraVENous Daily    sodium chloride flush  5-40 mL IntraVENous 2 times per day       Allergies:  Codeine, Hydrocodone, and Hydrocodone-acetaminophen    Social History:    TOBACCO:    + cig  ETOH:    None   DRUGS:   + opiates  MARITAL STATUS:      OCCUPATION:

## 2024-06-24 VITALS
SYSTOLIC BLOOD PRESSURE: 125 MMHG | DIASTOLIC BLOOD PRESSURE: 79 MMHG | HEIGHT: 69 IN | HEART RATE: 58 BPM | BODY MASS INDEX: 30.69 KG/M2 | WEIGHT: 207.23 LBS | TEMPERATURE: 98.5 F | RESPIRATION RATE: 17 BRPM | OXYGEN SATURATION: 98 %

## 2024-06-24 LAB
ANION GAP SERPL CALCULATED.3IONS-SCNC: 11 MMOL/L (ref 3–16)
BASOPHILS # BLD: 0.1 K/UL (ref 0–0.2)
BASOPHILS NFR BLD: 0.5 %
BUN SERPL-MCNC: 19 MG/DL (ref 7–20)
CALCIUM SERPL-MCNC: 8.6 MG/DL (ref 8.3–10.6)
CHLORIDE SERPL-SCNC: 104 MMOL/L (ref 99–110)
CO2 SERPL-SCNC: 23 MMOL/L (ref 21–32)
CREAT SERPL-MCNC: 1 MG/DL (ref 0.9–1.3)
DEPRECATED RDW RBC AUTO: 13.5 % (ref 12.4–15.4)
EKG ATRIAL RATE: 50 BPM
EKG DIAGNOSIS: NORMAL
EKG P AXIS: 37 DEGREES
EKG P-R INTERVAL: 224 MS
EKG Q-T INTERVAL: 438 MS
EKG QRS DURATION: 74 MS
EKG QTC CALCULATION (BAZETT): 399 MS
EKG R AXIS: 48 DEGREES
EKG T AXIS: 33 DEGREES
EKG VENTRICULAR RATE: 50 BPM
EOSINOPHIL # BLD: 0.1 K/UL (ref 0–0.6)
EOSINOPHIL NFR BLD: 1 %
GFR SERPLBLD CREATININE-BSD FMLA CKD-EPI: >90 ML/MIN/{1.73_M2}
GLUCOSE SERPL-MCNC: 131 MG/DL (ref 70–99)
HCT VFR BLD AUTO: 35.6 % (ref 40.5–52.5)
HCT VFR BLD AUTO: 37 % (ref 40.5–52.5)
HGB BLD-MCNC: 11.6 G/DL (ref 13.5–17.5)
HGB BLD-MCNC: 12 G/DL (ref 13.5–17.5)
LACTATE BLDV-SCNC: 1.3 MMOL/L (ref 0.4–2)
LYMPHOCYTES # BLD: 3.2 K/UL (ref 1–5.1)
LYMPHOCYTES NFR BLD: 25.1 %
MCH RBC QN AUTO: 27.9 PG (ref 26–34)
MCHC RBC AUTO-ENTMCNC: 32.6 G/DL (ref 31–36)
MCV RBC AUTO: 85.8 FL (ref 80–100)
MONOCYTES # BLD: 0.7 K/UL (ref 0–1.3)
MONOCYTES NFR BLD: 5.3 %
NEUTROPHILS # BLD: 8.8 K/UL (ref 1.7–7.7)
NEUTROPHILS NFR BLD: 68.1 %
PLATELET # BLD AUTO: 229 K/UL (ref 135–450)
PMV BLD AUTO: 7.6 FL (ref 5–10.5)
POTASSIUM SERPL-SCNC: 3.7 MMOL/L (ref 3.5–5.1)
POTASSIUM SERPL-SCNC: 3.8 MMOL/L (ref 3.5–5.1)
RBC # BLD AUTO: 4.31 M/UL (ref 4.2–5.9)
SODIUM SERPL-SCNC: 138 MMOL/L (ref 136–145)
WBC # BLD AUTO: 12.9 K/UL (ref 4–11)

## 2024-06-24 PROCEDURE — 93010 ELECTROCARDIOGRAM REPORT: CPT | Performed by: INTERNAL MEDICINE

## 2024-06-24 PROCEDURE — 6360000002 HC RX W HCPCS: Performed by: NURSE PRACTITIONER

## 2024-06-24 PROCEDURE — 85018 HEMOGLOBIN: CPT

## 2024-06-24 PROCEDURE — 85025 COMPLETE CBC W/AUTO DIFF WBC: CPT

## 2024-06-24 PROCEDURE — 80048 BASIC METABOLIC PNL TOTAL CA: CPT

## 2024-06-24 PROCEDURE — 84132 ASSAY OF SERUM POTASSIUM: CPT

## 2024-06-24 PROCEDURE — 6370000000 HC RX 637 (ALT 250 FOR IP): Performed by: INTERNAL MEDICINE

## 2024-06-24 PROCEDURE — 36415 COLL VENOUS BLD VENIPUNCTURE: CPT

## 2024-06-24 PROCEDURE — 93005 ELECTROCARDIOGRAM TRACING: CPT | Performed by: INTERNAL MEDICINE

## 2024-06-24 PROCEDURE — 83605 ASSAY OF LACTIC ACID: CPT

## 2024-06-24 PROCEDURE — C9113 INJ PANTOPRAZOLE SODIUM, VIA: HCPCS | Performed by: INTERNAL MEDICINE

## 2024-06-24 PROCEDURE — 2580000003 HC RX 258: Performed by: INTERNAL MEDICINE

## 2024-06-24 PROCEDURE — 85014 HEMATOCRIT: CPT

## 2024-06-24 PROCEDURE — 6360000002 HC RX W HCPCS: Performed by: INTERNAL MEDICINE

## 2024-06-24 RX ADMIN — LIDOCAINE HYDROCHLORIDE 5 ML: 20 SOLUTION ORAL at 12:51

## 2024-06-24 RX ADMIN — LIDOCAINE HYDROCHLORIDE 5 ML: 20 SOLUTION ORAL at 06:09

## 2024-06-24 RX ADMIN — HYDRALAZINE HYDROCHLORIDE 10 MG: 20 INJECTION, SOLUTION INTRAMUSCULAR; INTRAVENOUS at 06:08

## 2024-06-24 RX ADMIN — LIDOCAINE HYDROCHLORIDE 5 ML: 20 SOLUTION ORAL at 09:27

## 2024-06-24 RX ADMIN — RIVAROXABAN 20 MG: 20 TABLET, FILM COATED ORAL at 07:48

## 2024-06-24 RX ADMIN — PANTOPRAZOLE SODIUM 40 MG: 40 INJECTION, POWDER, FOR SOLUTION INTRAVENOUS at 07:45

## 2024-06-24 RX ADMIN — SODIUM CHLORIDE, POTASSIUM CHLORIDE, SODIUM LACTATE AND CALCIUM CHLORIDE: 600; 310; 30; 20 INJECTION, SOLUTION INTRAVENOUS at 06:12

## 2024-06-24 ASSESSMENT — PAIN SCALES - GENERAL
PAINLEVEL_OUTOF10: 5
PAINLEVEL_OUTOF10: 3
PAINLEVEL_OUTOF10: 0
PAINLEVEL_OUTOF10: 0

## 2024-06-24 ASSESSMENT — PAIN DESCRIPTION - LOCATION: LOCATION: THROAT

## 2024-06-24 NOTE — CARE COORDINATION
Case Management Assessment  Initial Evaluation    Date/Time of Evaluation: 6/24/2024 9:47 AM  Assessment Completed by: Iesha Allen RN    If patient is discharged prior to next notation, then this note serves as note for discharge by case management.    Patient Name: Jf Streeter                   YOB: 1975  Diagnosis: Dehydration [E86.0]  Septicemia (HCC) [A41.9]  GARETT (acute kidney injury) (HCC) [N17.9]  Sepsis (HCC) [A41.9]  Nausea and vomiting, unspecified vomiting type [R11.2]                   Date / Time: 6/21/2024  3:11 PM    Patient Admission Status: Inpatient   Readmission Risk (Low < 19, Mod (19-27), High > 27): Readmission Risk Score: 14.5    Current PCP: Jeff Pathak  PCP verified by CM? Yes    Chart Reviewed: Yes      History Provided by: Patient  Patient Orientation: Alert and Oriented    Patient Cognition: Alert    Hospitalization in the last 30 days (Readmission):  No    If yes, Readmission Assessment in CM Navigator will be completed.    Advance Directives:      Code Status: Full Code   Patient's Primary Decision Maker is: Legal Next of Kin    Primary Decision Maker: Suzette Michelle - Other - [phone]    Secondary Decision Maker: Bruce Streeterothy - Brother/Sister - [phone]    Discharge Planning:    Patient lives with: Spouse/Significant Other Type of Home: Apartment  Primary Care Giver: Self  Patient Support Systems include: Spouse/Significant Other   Current Financial resources: Medicaid  Current community resources: None  Current services prior to admission: None            Current DME:              Type of Home Care services:  None    ADLS  Prior functional level: Independent in ADLs/IADLs  Current functional level: Independent in ADLs/IADLs    PT AM-PAC: 22 /24  OT AM-PAC: 24 /24    Family can provide assistance at DC: Yes  Would you like Case Management to discuss the discharge plan with any other family members/significant others, and if so, who?

## 2024-06-24 NOTE — PLAN OF CARE
Problem: Discharge Planning  Goal: Discharge to home or other facility with appropriate resources  6/22/2024 1040 by Nikki Hamlin RN  Outcome: Progressing     Problem: Pain  Goal: Verbalizes/displays adequate comfort level or baseline comfort level  6/22/2024 1040 by Nikki Hamlin, RN  Outcome: Progressing     
  Problem: Discharge Planning  Goal: Discharge to home or other facility with appropriate resources  6/22/2024 2207 by Darshana Christine, RN  Outcome: Progressing  Flowsheets (Taken 6/22/2024 2139)  Discharge to home or other facility with appropriate resources:   Identify barriers to discharge with patient and caregiver   Arrange for needed discharge resources and transportation as appropriate   Identify discharge learning needs (meds, wound care, etc)  6/22/2024 1040 by Nikki Hamlin RN  Outcome: Progressing     Problem: Pain  Goal: Verbalizes/displays adequate comfort level or baseline comfort level  6/22/2024 2207 by Darshana Christine, RN  Outcome: Progressing  6/22/2024 1040 by Nikki Hamlin RN  Outcome: Progressing     Problem: Skin/Tissue Integrity  Goal: Absence of new skin breakdown  Description: 1.  Monitor for areas of redness and/or skin breakdown  2.  Assess vascular access sites hourly  3.  Every 4-6 hours minimum:  Change oxygen saturation probe site  4.  Every 4-6 hours:  If on nasal continuous positive airway pressure, respiratory therapy assess nares and determine need for appliance change or resting period.  Outcome: Progressing     Problem: Safety - Adult  Goal: Free from fall injury  Outcome: Progressing     
  Problem: Discharge Planning  Goal: Discharge to home or other facility with appropriate resources  6/24/2024 0903 by Татьяна Soriano RN  Outcome: Progressing  Flowsheets (Taken 6/24/2024 0744)  Discharge to home or other facility with appropriate resources: Refer to discharge planning if patient needs post-hospital services based on physician order or complex needs related to functional status, cognitive ability or social support system  6/24/2024 0903 by Татьяна Soriano RN  Outcome: Progressing  Flowsheets (Taken 6/24/2024 0744)  Discharge to home or other facility with appropriate resources: Refer to discharge planning if patient needs post-hospital services based on physician order or complex needs related to functional status, cognitive ability or social support system     Problem: Pain  Goal: Verbalizes/displays adequate comfort level or baseline comfort level  6/24/2024 0903 by Татьяна Soriano RN  Outcome: Progressing  6/24/2024 0903 by Татьяна Soriano RN  Outcome: Progressing     Problem: Skin/Tissue Integrity  Goal: Absence of new skin breakdown  Description: 1.  Monitor for areas of redness and/or skin breakdown  2.  Assess vascular access sites hourly  3.  Every 4-6 hours minimum:  Change oxygen saturation probe site  4.  Every 4-6 hours:  If on nasal continuous positive airway pressure, respiratory therapy assess nares and determine need for appliance change or resting period.  6/24/2024 0903 by Татьяна Soriano RN  Outcome: Progressing  6/24/2024 0903 by Татьяна Soriano RN  Outcome: Progressing     Problem: Safety - Adult  Goal: Free from fall injury  6/24/2024 0903 by Татьяна Soriano RN  Outcome: Progressing  6/24/2024 0903 by Татьяна Soriano RN  Outcome: Progressing     
  Problem: Discharge Planning  Goal: Discharge to home or other facility with appropriate resources  Outcome: Progressing     Problem: Pain  Goal: Verbalizes/displays adequate comfort level or baseline comfort level  Outcome: Progressing     
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     >>For CHF and Comorbidity documentation on Education Time and Topics, please see Education Tab      Pt resting in bed at this time on room air. Pt denies shortness of breath. Pt without lower extremity edema.     Patient and/or Family's stated Goal of Care this Admission: increase activity tolerance and be more comfortable prior to discharge        :

## 2024-06-24 NOTE — DISCHARGE SUMMARY
Hospital Medicine Discharge Summary    Patient: Jf Streeter   : 1975     Admit Date: 2024   Discharge Date:   ***  Disposition:  []Home   []HHC  []SNF  []ECF  []Acute Rehab  []LTAC  []Hospice  Code status:  []Full  []DNR/CCA  []Limited (DNR/CCA with Do Not Intubate)  []DNRCC  Condition at Discharge: Stable  Primary Care Provider: Jeff Pathak    Admitting Provider: Dorothea Phillips MD  Discharge Provider: Kiesha Em MD     Discharge Diagnoses:      Active Hospital Problems    Diagnosis     Sepsis (HCC) [A41.9]        Presenting Admission History:      ***     Assessment/Plan:      ***    Physical Exam Performed:      /79   Pulse 58   Temp 98.5 °F (36.9 °C) (Oral)   Resp 17   Ht 1.753 m (5' 9\")   Wt 94 kg (207 lb 3.7 oz)   SpO2 98%   BMI 30.60 kg/m²     ***  General appearance:  No apparent distress, appears stated age and cooperative.  Respiratory:  Normal respiratory effort.   Cardiovascular:  Regular rate and rhythm.  Abdomen:  Soft, non-tender, non-distended.  Musculoskeletal:  No edema  Neurologic:  Non-focal  Psychiatric:  Alert and oriented    Patient Discharge Instructions:      Follow up:    1.  Primary Care Provider Jeff Pathak in the next 1-2 weeks.  ***    The patient was seen and examined on day of discharge and this discharge summary is in conjunction with any daily progress note from day of discharge. Time spent on discharge: 3*** minutes in the examination, evaluation, counseling and review of medications and discharge plan.    ------------------------------------------------------------------------------------------------------------------------------------------------------    Discharge Medications:   Current Discharge Medication List        Current Discharge Medication List        Current Discharge Medication List        CONTINUE these medications which have NOT CHANGED    Details   rivaroxaban (XARELTO) 20 MG TABS tablet Take 1

## 2024-06-24 NOTE — PROGRESS NOTES
Hospital Medicine Progress Note      Date of Admission: 6/21/2024  Hospital Day: 2    Chief Admission Complaint:  Nausea and vomiting, generalized weakness fatigue right sided abdominal pain for 1 day      Subjective:   feel tired , sleepy , c/o throat pain , right flank pain  Has fever   No HA, of focal symptoms  Has cough   N +  No vomiting   Had coffee ground emesis  at home   Takes alcohol    Presenting Admission History:       48 y.o. male who presented to the emergency room with a chief complaint of     Upon arrival to the emergency room patient is alert awake feeling very weak unable to answer all the questions.  Patient was significant other who is present in the room provided most of the data     Patient's past medical history significant for pulmonary emboli on Xarelto with recent hospitalization, hypertension, anxiety.  Patient reports that he uses fentanyl and he has been having generalized bodyaches and feeling overall pain.    Patient is noticed to be tachycardic hypertensive  Upon further questioning patient's wife explains that patient has been working in his car 3 to 4 hours outdoor.    After that he complained of nausea vomiting headaches abdominal pain        Patient denies any shortness of breath no palpitation orthopnea dyspnea with exertion no diaphoresis.     Patient denies any muscle weakness paralysis or loss of sensation no blurry vision no double vision no neck stiffness.  No recent traveling no history of exposure to sick contacts     The workup in the emergency room notable for:     Patient meets SIRS sepsis criteria with elevated heart rate tachycardia leukocytosis infection, no elevated lactate     Twelve-lead EKG done in the emergency room showed sinus tachycardia no acute ischemic patient complaining of right-sided tenderness as well.    Assessment/Plan:      Current Principal Problem:  Sepsis (HCC)    Nausea vomiting right flank pain fever-possibly right 
  Hospital Medicine Progress Note      Date of Admission: 6/21/2024  Hospital Day: 3    Chief Admission Complaint:  Nausea and vomiting, generalized weakness fatigue right sided abdominal pain for 1 day      Subjective:   dup and coop with rehab   , c/o throat pain ,  no right flank pain   No  fever   Has cough   Poor po intake as of throat pain    Presenting Admission History:       48 y.o. male who presented to the emergency room with a chief complaint of     Upon arrival to the emergency room patient is alert awake feeling very weak unable to answer all the questions.  Patient was significant other who is present in the room provided most of the data     Patient's past medical history significant for pulmonary emboli on Xarelto with recent hospitalization, hypertension, anxiety.  Patient reports that he uses fentanyl and he has been having generalized bodyaches and feeling overall pain.    Patient is noticed to be tachycardic hypertensive  Upon further questioning patient's wife explains that patient has been working in his car 3 to 4 hours outdoor.    After that he complained of nausea vomiting headaches abdominal pain        Patient denies any shortness of breath no palpitation orthopnea dyspnea with exertion no diaphoresis.     Patient denies any muscle weakness paralysis or loss of sensation no blurry vision no double vision no neck stiffness.  No recent traveling no history of exposure to sick contacts     The workup in the emergency room notable for:     Patient meets SIRS sepsis criteria with elevated heart rate tachycardia leukocytosis infection, no elevated lactate     Twelve-lead EKG done in the emergency room showed sinus tachycardia no acute ischemic patient complaining of right-sided tenderness as well.    Assessment/Plan:      Current Principal Problem:  Sepsis (HCC)    Nausea vomiting right flank pain fever-possibly right pyelonephritis  Unfortunately UA was not sent  Currently patient is on 
  Physician Progress Note      PATIENT:               NARCISO ABBOTT  CSN #:                  [id number]  :                       1975  ADMIT DATE:       2024 3:11 PM  DISCH DATE:        2024 1:13 PM  RESPONDING  PROVIDER #:        Kiesha Alfaro MD          QUERY TEXT:    Pt admitted with nausea, vomiting and right flank pain. Pt noted to have   leukocytosis, fever, tachycardia and tachypnea. If possible, please document   in the progress notes and discharge summary if you are evaluating and /or   treating any of the following:    The medical record reflects the following:  Risk Factors: PE, HTN, anxiety, drug abuse- per ED note did Fentanyl last   night  Clinical Indicators: VS-100.6, 154, 26, 108/72 - 158/101 - 139/81  .....WBC   24.2, lactic acid 2.2, lactate 2.5, CRP 36.9, procal 0.18.......CT-  Distal   esophageal wall thickening may relate to esophagitis.......Urine culture- no   growth......Per H&P on 24- Concern for sepsis....Per ID consult on   24-Likely volume depletion / dehydration with GARETT and reactive   leukocytosis-D/C antibiotics......Per attending pn on 24-Sepsis-secondary   to above Less likely per ID  Treatment: Cefepime IV, Vanco IV, IVF, ID consult, BC's, U/A, Urine culture    Thank You,  Ana Paula Cheema RN BSN CDS CRCR  [email or web address]  Options provided:  -- Sepsis due to possible pyelonephritis present on admission  -- Sepsis was ruled out  -- Other - I will add my own diagnosis  -- Disagree - Not applicable / Not valid  -- Disagree - Clinically unable to determine / Unknown  -- Refer to Clinical Documentation Reviewer    PROVIDER RESPONSE TEXT:    After further study, sepsis was ruled out for this patient.    Query created by: Ana Paula Cheema on 2024 9:38 AM      Electronically signed by:  Kiesha Alfaro MD 2024 4:01 PM          
4 Eyes Skin Assessment     NAME:  Jf Streeter  YOB: 1975  MEDICAL RECORD NUMBER:  [id number]    The patient is being assessed for  Admission    I agree that at least one RN has performed a thorough Head to Toe Skin Assessment on the patient. ALL assessment sites listed below have been assessed.      Areas assessed by both nurses:    Head, Face, Ears, Shoulders, Back, Chest, Arms, Elbows, Hands, Sacrum. Buttock, Coccyx, Ischium, Legs. Feet and Heels, and Under Medical Devices         Does the Patient have a Wound? No noted wound(s)       Travis Prevention initiated by RN: No  Wound Care Orders initiated by RN: No    Pressure Injury (Stage 3,4, Unstageable, DTI, NWPT, and Complex wounds) if present, place Wound referral order by RN under : No    New Ostomies, if present place, Ostomy referral order under : No     Nurse 1 eSignature: Electronically signed by Fabiola Arias RN on 6/22/24 at 12:11 AM EDT    **SHARE this note so that the co-signing nurse can place an eSignature**    Nurse 2 eSignature: Electronically signed by Lauren Celeste RN on 6/22/24 at 12:14 AM EDT   
ID    Chart reviewed  He remains AF     WBC has declined further after stopping abx, today 12 from 14  Cultures remain negative   CT ap was negative     No evidence of active infection     The elevated WBC is ascribed to dehydration     Will sign off  Please reach out if situation changes   LUIS ALFREDO TRACY MD    
Occupational / Physical Therapy    Pt chart reviewed and RN approved for therapy. Pt politely declining therapy this date 2/2 persistent nausea/ vomiting. Therapy will hold this date and will reattempt tomorrow as medically appropriate. Thank you.     Husam Khan OTR/L   Leslye King, PT, DPT   
Occupational Therapy  Facility/Department: [facility]2 CARD TELEMETRY  Occupational Therapy Initial Assessment    Name: Jf Streeter  : 1975  MRN: [id number]  Date of Service: 2024    Discharge Recommendations:  24 hour supervision or assist          Patient Diagnosis(es): The primary encounter diagnosis was Nausea and vomiting, unspecified vomiting type. Diagnoses of Dehydration, GARETT (acute kidney injury) (HCC), Septicemia (HCC), and Tachycardia were also pertinent to this visit.  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  Past Surgical History:  has a past surgical history that includes shoulder surgery (Left) and Pleural biopsy (Right, ).      Assessment   Assessment: Pt presents to [facility] with sepsis. Pt this date reports PLOF IND with ADLs. Pt this date demo'd ability to complete functional transfers and mobility with SPV without AD. Pt additionally demo'd ability to complete LB dressing. Declined need for toileting. Pt would continue to benefit from acute OT at this time to improve functional mobility and ADL independence. D/c recs for home 24hr when medically ready.     Prognosis: Good  Decision Making: Low Complexity  REQUIRES OT FOLLOW-UP: No  Activity Tolerance  Activity Tolerance: Patient Tolerated treatment well        Plan   Occupational Therapy Plan  Times Per Week: One time visit     Restrictions  Restrictions/Precautions  Restrictions/Precautions: Up as Tolerated, General Precautions  Position Activity Restriction  Other position/activity restrictions: IV    Subjective   General  Chart Reviewed: Yes  Patient assessed for rehabilitation services?: Yes  Family / Caregiver Present: Yes (wife)  Referring Practitioner: Kiesha Em MD  Diagnosis: Sepsis     Social/Functional History  Social/Functional 
Patient admitted to room 212 from ER.  Patient oriented to room, call light, bed rails, phone, lights and bathroom.  Patient instructed about the schedule of the day including: vital sign frequency, lab draws, possible tests, frequency of MD and staff rounds, including RN/MD rounding together at bedside, daily weights, and I &O's.  Patient instructed about prescribed diet, how to use call light and television.   bed alarm in place, patient aware of placement and reason.   Telemetry box  in place, patient aware of placement and reason.  Bed locked, in lowest position, side rails up 2/4, call light within reach.  Will continue to monitor.    
Patient hypertensive overnight, hospitalist notified, PRN hydralazine administered as ordered. Patient given PRN viscous lidocaine for throat pain 5/10.   
Physical Therapy  Facility/Department: [facility] CARD TELEMETRY  Physical Therapy Initial Assessment & Discharge Summary     Name: Jf Streeter  : 1975  MRN: [id number]  Date of Service: 2024    Discharge Recommendations:  24 hour supervision or assist, No therapy recommended at discharge (initial 24 hour supervision)   PT Equipment Recommendations  Equipment Needed: No      Patient Diagnosis(es): The primary encounter diagnosis was Nausea and vomiting, unspecified vomiting type. Diagnoses of Dehydration, GARETT (acute kidney injury) (HCC), Septicemia (HCC), and Tachycardia were also pertinent to this visit.  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  Past Surgical History:  has a past surgical history that includes shoulder surgery (Left) and Pleural biopsy (Right, ).    Assessment   Assessment: Pt is awake and agreeable to therapy session. He is overall supervision for mobility, including 200' ambulation without AD and 3 stairs. Due to no acute needs, he is discharged from acute PT at this time.  Therapy Prognosis: Excellent  Decision Making: Low Complexity  Requires PT Follow-Up: No  Activity Tolerance  Activity Tolerance: Patient tolerated evaluation without incident       Plan   Physical Therapy Plan  General Plan: Discharge with evaluation only  Safety Devices  Type of Devices: Nurse notified, Left in chair, Chair alarm in place, Call light within reach, Gait belt  Restraints  Restraints Initially in Place: No     Restrictions  Restrictions/Precautions  Restrictions/Precautions: Up as Tolerated, General Precautions  Position Activity Restriction  Other position/activity restrictions: IV     Subjective   Pain: rates 6/10 pain in throat  General  Patient assessed for rehabilitation services?: Yes         Social/Functional 
Pt A&Ox 4 on RA. AM assessment and vitals completed and put into flowsheets. AM medications given with no s/s of aspiration. Pt with no questions or concerns voiced to RN at this time. Fall precautions in place and call light within reach.   Vitals:    06/24/24 0744   BP: (!) 151/99   Pulse: 50   Resp: 18   Temp: 98.2 °F (36.8 °C)   SpO2: 98%       
Pt with active discharge order. RN went over discharge instructions with patient, pt and pt's family states understanding. IV and telemetry removed. Pt with no questions or concerns voiced to RN at this time.       Pt requesting throat irritation medication he was getting here, RN notified provider. NO new orders placed. Dr. Em recommended PRN tylenol.   
PM     No Growth to date.  Any change in status will be called.     Organism: No results found for: \"ORG\"      Kiesha Em MD

## 2024-06-25 LAB
BACTERIA BLD CULT ORG #2: NORMAL
BACTERIA BLD CULT: NORMAL